# Patient Record
Sex: FEMALE | Race: WHITE | NOT HISPANIC OR LATINO | Employment: FULL TIME | URBAN - METROPOLITAN AREA
[De-identification: names, ages, dates, MRNs, and addresses within clinical notes are randomized per-mention and may not be internally consistent; named-entity substitution may affect disease eponyms.]

---

## 2022-02-11 LAB
AGE: 33
AGE: 33
ALBUMIN SERPL-MCNC: 4.4 G/DL (ref 3.4–5)
ALBUMIN SERPL-MCNC: 4.4 G/DL (ref 3.4–5)
ALBUMIN/GLOB SERPL: 1.8 {RATIO}
ALBUMIN/GLOB SERPL: 1.8 {RATIO}
ALP SERPL-CCNC: 68 U/L (ref 50–144)
ALP SERPL-CCNC: 68 U/L (ref 50–144)
ALT SERPL-CCNC: 14 U/L (ref 1–45)
ALT SERPL-CCNC: 14 U/L (ref 1–45)
ANION GAP SERPL CALC-SCNC: 6 MMOL/L (ref 2–13)
ANION GAP SERPL CALC-SCNC: 6 MMOL/L (ref 2–13)
AST SERPL-CCNC: 22 U/L (ref 14–36)
AST SERPL-CCNC: 22 U/L (ref 14–36)
BASOPHILS # BLD AUTO: 0.02 10*3/UL (ref 0.01–0.08)
BASOPHILS NFR BLD AUTO: 0.3 % (ref 0.1–1.2)
BILIRUB SERPL-MCNC: 0.47 MG/DL (ref 0–1)
BILIRUB SERPL-MCNC: 0.47 MG/DL (ref 0–1)
BUN SERPL-MCNC: 13 MG/DL (ref 7–20)
BUN SERPL-MCNC: 13 MG/DL (ref 7–20)
CALCIUM SERPL-MCNC: 9.1 MG/DL (ref 8.4–10.2)
CALCIUM SERPL-MCNC: 9.1 MG/DL (ref 8.4–10.2)
CHLORIDE SERPL-SCNC: 104 MMOL/L (ref 94–110)
CHLORIDE SERPL-SCNC: 104 MMOL/L (ref 94–110)
CHOLEST SERPL-MCNC: 166 MG/DL (ref 0–200)
CO2 SERPL-SCNC: 29 MMOL/L (ref 21–32)
CO2 SERPL-SCNC: 29 MMOL/L (ref 21–32)
CREAT SERPL-MCNC: 0.66 MG/DL (ref 0.52–1.04)
CREAT SERPL-MCNC: 0.66 MG/DL (ref 0.52–1.04)
CREAT/UREA NIT SERPL: 19.7 (ref 12–20)
CREAT/UREA NIT SERPL: 19.7 (ref 12–20)
EOSINOPHIL # BLD AUTO: 0.07 10*3/UL (ref 0.04–0.36)
EOSINOPHIL NFR BLD AUTO: 1.2 % (ref 0.7–7)
ERYTHROCYTE [DISTWIDTH] IN BLOOD BY AUTOMATED COUNT: 12.4 % (ref 11–14.5)
EST. AVERAGE GLUCOSE BLD GHB EST-MCNC: 90 MG/DL (ref 70–115)
GLOBULIN SER-MCNC: 2.5 G/DL (ref 2–3.9)
GLOBULIN SER-MCNC: 2.5 G/DL (ref 2–3.9)
GLUCOSE SERPL-MCNC: 84 MG/DL (ref 70–115)
GLUCOSE SERPL-MCNC: 84 MG/DL (ref 70–115)
HBA1C MFR BLD: 5 % (ref 4–6)
HCT VFR BLD AUTO: 37.9 % (ref 36–48)
HDLC SERPL-MCNC: 55 MG/DL (ref 40–60)
HGB BLD-MCNC: 12.5 G/DL (ref 11.8–16)
IMM GRANULOCYTES # BLD AUTO: 0.02 10*3/UL (ref 0–0.03)
IMM GRANULOCYTES NFR BLD AUTO: 0.3 % (ref 0–0.5)
LDLC SERPL CALC-MCNC: 93.9 MG/DL (ref 30–100)
LYMPHOCYTES # BLD AUTO: 2.38 10*3/UL (ref 1.16–3.74)
LYMPHOCYTES NFR BLD AUTO: 40.1 % (ref 20–55)
MANUAL DIFF? (OHS): NORMAL
MCH RBC QN AUTO: 31.6 PG (ref 27–34)
MCHC RBC AUTO-ENTMCNC: 33 G/DL (ref 31–37)
MCV RBC AUTO: 95.9 FL (ref 79–99)
MONOCYTES # BLD AUTO: 0.31 10*3/UL (ref 0.24–0.36)
MONOCYTES NFR BLD AUTO: 5.2 % (ref 4.7–12.5)
NEUTROPHILS # BLD AUTO: 3.14 10*3/UL (ref 1.56–6.13)
NEUTROPHILS NFR BLD AUTO: 52.9 % (ref 37–73)
PLATELET # BLD AUTO: 177 10*3/UL (ref 140–371)
PMV BLD AUTO: 10.2 FL (ref 9.4–12.4)
POTASSIUM SERPL-SCNC: 4.2 MMOL/L (ref 3.5–5.1)
POTASSIUM SERPL-SCNC: 4.2 MMOL/L (ref 3.5–5.1)
PROT SERPL-MCNC: 6.9 G/DL (ref 6.3–8.2)
PROT SERPL-MCNC: 6.9 G/DL (ref 6.3–8.2)
RBC # BLD AUTO: 3.95 10*6/UL (ref 4–5.1)
SODIUM SERPL-SCNC: 139 MMOL/L (ref 135–145)
SODIUM SERPL-SCNC: 139 MMOL/L (ref 135–145)
TRIGL SERPL-MCNC: 150 MG/DL (ref 30–200)
TSH SERPL-ACNC: 1.61 M[IU]/L (ref 0.36–3.74)
WBC # SPEC AUTO: 5.9 10*3/UL (ref 4–11.5)

## 2022-04-09 ENCOUNTER — HISTORICAL (OUTPATIENT)
Dept: ADMINISTRATIVE | Facility: HOSPITAL | Age: 32
End: 2022-04-09

## 2022-04-25 VITALS
DIASTOLIC BLOOD PRESSURE: 63 MMHG | BODY MASS INDEX: 36.6 KG/M2 | HEIGHT: 64 IN | WEIGHT: 214.38 LBS | SYSTOLIC BLOOD PRESSURE: 128 MMHG

## 2022-05-14 ENCOUNTER — HISTORICAL (OUTPATIENT)
Dept: ADMINISTRATIVE | Facility: HOSPITAL | Age: 32
End: 2022-05-14

## 2023-01-25 DIAGNOSIS — F41.9 ANXIETY: Primary | ICD-10-CM

## 2023-01-25 RX ORDER — ALPRAZOLAM 0.25 MG/1
0.25 TABLET ORAL 3 TIMES DAILY PRN
Qty: 30 TABLET | Refills: 0 | Status: SHIPPED | OUTPATIENT
Start: 2023-01-25 | End: 2023-03-01 | Stop reason: SDUPTHER

## 2023-01-25 RX ORDER — ALPRAZOLAM 0.25 MG/1
0.25 TABLET ORAL 3 TIMES DAILY PRN
COMMUNITY
Start: 2022-02-07 | End: 2023-01-25 | Stop reason: SDUPTHER

## 2023-01-25 NOTE — TELEPHONE ENCOUNTER
----- Message from Ankita Mcdaniels sent at 1/25/2023  9:39 AM CST -----  Regarding: refill  Patient is requesting a refill on     alPRAzolam 0.25 mg oral tab    Osmans Addie

## 2023-03-01 DIAGNOSIS — F41.9 ANXIETY: ICD-10-CM

## 2023-03-01 RX ORDER — ALPRAZOLAM 0.25 MG/1
0.25 TABLET ORAL 3 TIMES DAILY PRN
Qty: 30 TABLET | Refills: 0 | Status: SHIPPED | OUTPATIENT
Start: 2023-03-01 | End: 2023-08-02

## 2023-03-01 NOTE — TELEPHONE ENCOUNTER
----- Message from Felisha Garcia sent at 3/1/2023  3:05 PM CST -----  Regarding: Refill  .Type:  RX Refill Request    Who Called: Joaquina Mortensen   Refill or New Rx: Refill   RX Name and Strength:ALPRAZolam (XANAX) 0.25 MG tablet  How is the patient currently taking it? (ex. 1XDay):1x daily   Is this a 30 day or 90 day RX:30 days   Preferred Pharmacy with phone number:[facility] in [place]   Local or Mail Order:Local   Ordering Provider:Arslan  Would the patient rather a call back or a response via [facility]? Call back   Best Call Back Number:[phone]  Additional Information:

## 2023-03-16 ENCOUNTER — TELEPHONE (OUTPATIENT)
Dept: FAMILY MEDICINE | Facility: CLINIC | Age: 33
End: 2023-03-16
Payer: MEDICAID

## 2023-03-22 ENCOUNTER — OFFICE VISIT (OUTPATIENT)
Dept: FAMILY MEDICINE | Facility: CLINIC | Age: 33
End: 2023-03-22
Payer: MEDICAID

## 2023-03-22 VITALS
OXYGEN SATURATION: 98 % | DIASTOLIC BLOOD PRESSURE: 88 MMHG | TEMPERATURE: 98 F | HEIGHT: 62 IN | BODY MASS INDEX: 35.88 KG/M2 | SYSTOLIC BLOOD PRESSURE: 144 MMHG | HEART RATE: 94 BPM | WEIGHT: 195 LBS

## 2023-03-22 DIAGNOSIS — F41.9 ANXIETY: Primary | ICD-10-CM

## 2023-03-22 PROCEDURE — 3008F BODY MASS INDEX DOCD: CPT | Mod: CPTII,,, | Performed by: PEDIATRICS

## 2023-03-22 PROCEDURE — 99214 PR OFFICE/OUTPT VISIT, EST, LEVL IV, 30-39 MIN: ICD-10-PCS | Mod: ,,, | Performed by: PEDIATRICS

## 2023-03-22 PROCEDURE — 1159F PR MEDICATION LIST DOCUMENTED IN MEDICAL RECORD: ICD-10-PCS | Mod: CPTII,,, | Performed by: PEDIATRICS

## 2023-03-22 PROCEDURE — 1159F MED LIST DOCD IN RCRD: CPT | Mod: CPTII,,, | Performed by: PEDIATRICS

## 2023-03-22 PROCEDURE — 3008F PR BODY MASS INDEX (BMI) DOCUMENTED: ICD-10-PCS | Mod: CPTII,,, | Performed by: PEDIATRICS

## 2023-03-22 PROCEDURE — 3077F SYST BP >= 140 MM HG: CPT | Mod: CPTII,,, | Performed by: PEDIATRICS

## 2023-03-22 PROCEDURE — 3079F DIAST BP 80-89 MM HG: CPT | Mod: CPTII,,, | Performed by: PEDIATRICS

## 2023-03-22 PROCEDURE — 1160F PR REVIEW ALL MEDS BY PRESCRIBER/CLIN PHARMACIST DOCUMENTED: ICD-10-PCS | Mod: CPTII,,, | Performed by: PEDIATRICS

## 2023-03-22 PROCEDURE — 3077F PR MOST RECENT SYSTOLIC BLOOD PRESSURE >= 140 MM HG: ICD-10-PCS | Mod: CPTII,,, | Performed by: PEDIATRICS

## 2023-03-22 PROCEDURE — 3079F PR MOST RECENT DIASTOLIC BLOOD PRESSURE 80-89 MM HG: ICD-10-PCS | Mod: CPTII,,, | Performed by: PEDIATRICS

## 2023-03-22 PROCEDURE — 1160F RVW MEDS BY RX/DR IN RCRD: CPT | Mod: CPTII,,, | Performed by: PEDIATRICS

## 2023-03-22 PROCEDURE — 99214 OFFICE O/P EST MOD 30 MIN: CPT | Mod: ,,, | Performed by: PEDIATRICS

## 2023-03-22 RX ORDER — SUMATRIPTAN 50 MG/1
1 TABLET, FILM COATED ORAL
COMMUNITY
Start: 2022-10-25 | End: 2024-03-21

## 2023-03-22 RX ORDER — TRAZODONE HYDROCHLORIDE 150 MG/1
150 TABLET ORAL NIGHTLY
COMMUNITY
Start: 2023-01-25 | End: 2023-11-13

## 2023-03-22 RX ORDER — BREXPIPRAZOLE 1 MG/1
1 TABLET ORAL DAILY
COMMUNITY
Start: 2022-02-07 | End: 2023-11-02

## 2023-03-22 RX ORDER — CITALOPRAM 40 MG/1
40 TABLET, FILM COATED ORAL DAILY
COMMUNITY
Start: 2023-01-25 | End: 2023-06-20

## 2023-03-23 ENCOUNTER — TELEPHONE (OUTPATIENT)
Dept: FAMILY MEDICINE | Facility: CLINIC | Age: 33
End: 2023-03-23

## 2023-03-23 DIAGNOSIS — G47.30 SLEEP APNEA, UNSPECIFIED TYPE: Primary | ICD-10-CM

## 2023-03-23 NOTE — TELEPHONE ENCOUNTER
----- Message from Alina Morrison MA sent at 3/23/2023  1:22 PM CDT -----  Pt called about a sleep study.  I dont have a referral for this.  She was wanting to know if Dr Mcdaniels wanted her to have it done

## 2023-03-25 NOTE — PROGRESS NOTES
Subjective:       Patient ID: Joaquina Mortensen is a 32 y.o. female.    Chief Complaint: Fatigue (Confusion and anxiety)    HPI    We talked about her anxiety.  She has panic symptoms that she describes as fast heartbeats, shortness of breath, dizziness, and acute worsening in anxiety.  She is been having slightly more severe symptoms recently.  She does not report any specific triggers.  Recently while driving she had sort of an out-of-body experience where she felt really dizzy and lightheaded and just became confused.  She is been tired lately.      She does not report any shortness of breath, chest pain, syncope, irregular heartbeats.  She denies severe headaches, vision changes, true vertigo, tinnitus, hearing loss, focal areas of weakness or numbness.  Her sleeping is at her baseline most nights.  She does not report worsening depression symptoms.        Objective:      Physical Exam  Constitutional:       General: She is not in acute distress.     Appearance: Normal appearance.   HENT:      Mouth/Throat:      Pharynx: Oropharynx is clear.   Eyes:      General: No scleral icterus.     Extraocular Movements: Extraocular movements intact.      Conjunctiva/sclera: Conjunctivae normal.      Pupils: Pupils are equal, round, and reactive to light.   Neck:      Vascular: No carotid bruit.   Cardiovascular:      Rate and Rhythm: Normal rate and regular rhythm.      Pulses: Normal pulses.      Heart sounds: Normal heart sounds. No murmur heard.    No friction rub. No gallop.   Pulmonary:      Effort: Pulmonary effort is normal.      Breath sounds: Normal breath sounds.   Abdominal:      General: Abdomen is flat. Bowel sounds are normal. There is no distension.      Palpations: Abdomen is soft. There is no mass.      Tenderness: There is no abdominal tenderness.   Musculoskeletal:      Cervical back: Normal range of motion and neck supple. No tenderness.   Neurological:      General: No focal deficit present.       Mental Status: She is oriented to person, place, and time.      Cranial Nerves: Cranial nerve deficit present.      Sensory: No sensory deficit.      Motor: No weakness.      Coordination: Coordination normal.      Gait: Gait normal.      Deep Tendon Reflexes: Reflexes normal.   Psychiatric:         Mood and Affect: Mood normal.         Behavior: Behavior normal.         Thought Content: Thought content normal.         Judgment: Judgment normal.         Assessment:       Problem List Items Addressed This Visit    None  Visit Diagnoses       Anxiety    -  Primary            Plan:       I spent 30 minutes talking about about stress management and coping skills.  We discussed ways to deal with panic attacks.  I recommended she see a counselor. I encouraged her to exercise regularly specifically to improve her sleep and decrease her stress.  We talked about healthy eating, hydration and sleep.  We discussed reasons to return for medication adjustments and I mentioned the possibility of seeing a Psychiatrist.  We talked about other possibility etiologies of her symptoms and reasons to call for further testing such as syncope, weakness, vision changes, etc.

## 2023-03-30 ENCOUNTER — TELEPHONE (OUTPATIENT)
Dept: FAMILY MEDICINE | Facility: CLINIC | Age: 33
End: 2023-03-30
Payer: MEDICAID

## 2023-03-30 NOTE — TELEPHONE ENCOUNTER
Pt has been having panic attacks still.  She has appt with counselor on May 15th.  She said it was the soonest available.  Her bp today was her last four days has been 159/96, 132/92, 137/84, 145/74

## 2023-03-30 NOTE — TELEPHONE ENCOUNTER
----- Message from Ema Hewitt sent at 3/30/2023 11:06 AM CDT -----  Regarding: call back  B/P for appt. is still remaining high please call pt back     [phone]

## 2023-04-03 ENCOUNTER — OFFICE VISIT (OUTPATIENT)
Dept: FAMILY MEDICINE | Facility: CLINIC | Age: 33
End: 2023-04-03
Payer: MEDICAID

## 2023-04-03 VITALS
HEIGHT: 62 IN | WEIGHT: 195 LBS | DIASTOLIC BLOOD PRESSURE: 82 MMHG | OXYGEN SATURATION: 98 % | HEART RATE: 92 BPM | BODY MASS INDEX: 35.88 KG/M2 | SYSTOLIC BLOOD PRESSURE: 150 MMHG | TEMPERATURE: 99 F

## 2023-04-03 DIAGNOSIS — R00.9 ELEVATED HEART RATE WITH ELEVATED BLOOD PRESSURE WITHOUT DIAGNOSIS OF HYPERTENSION: ICD-10-CM

## 2023-04-03 DIAGNOSIS — R03.0 ELEVATED HEART RATE WITH ELEVATED BLOOD PRESSURE WITHOUT DIAGNOSIS OF HYPERTENSION: ICD-10-CM

## 2023-04-03 DIAGNOSIS — F41.9 ANXIETY: Primary | ICD-10-CM

## 2023-04-03 PROCEDURE — 99213 PR OFFICE/OUTPT VISIT, EST, LEVL III, 20-29 MIN: ICD-10-PCS | Mod: ,,, | Performed by: PEDIATRICS

## 2023-04-03 PROCEDURE — 1159F PR MEDICATION LIST DOCUMENTED IN MEDICAL RECORD: ICD-10-PCS | Mod: CPTII,,, | Performed by: PEDIATRICS

## 2023-04-03 PROCEDURE — 1159F MED LIST DOCD IN RCRD: CPT | Mod: CPTII,,, | Performed by: PEDIATRICS

## 2023-04-03 PROCEDURE — 3077F PR MOST RECENT SYSTOLIC BLOOD PRESSURE >= 140 MM HG: ICD-10-PCS | Mod: CPTII,,, | Performed by: PEDIATRICS

## 2023-04-03 PROCEDURE — 1160F PR REVIEW ALL MEDS BY PRESCRIBER/CLIN PHARMACIST DOCUMENTED: ICD-10-PCS | Mod: CPTII,,, | Performed by: PEDIATRICS

## 2023-04-03 PROCEDURE — 3079F DIAST BP 80-89 MM HG: CPT | Mod: CPTII,,, | Performed by: PEDIATRICS

## 2023-04-03 PROCEDURE — 3079F PR MOST RECENT DIASTOLIC BLOOD PRESSURE 80-89 MM HG: ICD-10-PCS | Mod: CPTII,,, | Performed by: PEDIATRICS

## 2023-04-03 PROCEDURE — 1160F RVW MEDS BY RX/DR IN RCRD: CPT | Mod: CPTII,,, | Performed by: PEDIATRICS

## 2023-04-03 PROCEDURE — 3077F SYST BP >= 140 MM HG: CPT | Mod: CPTII,,, | Performed by: PEDIATRICS

## 2023-04-03 PROCEDURE — 3008F PR BODY MASS INDEX (BMI) DOCUMENTED: ICD-10-PCS | Mod: CPTII,,, | Performed by: PEDIATRICS

## 2023-04-03 PROCEDURE — 3008F BODY MASS INDEX DOCD: CPT | Mod: CPTII,,, | Performed by: PEDIATRICS

## 2023-04-03 PROCEDURE — 99213 OFFICE O/P EST LOW 20 MIN: CPT | Mod: ,,, | Performed by: PEDIATRICS

## 2023-04-03 RX ORDER — PROPRANOLOL HYDROCHLORIDE 20 MG/1
20 TABLET ORAL 2 TIMES DAILY
Qty: 60 TABLET | Refills: 11 | Status: SHIPPED | OUTPATIENT
Start: 2023-04-03 | End: 2023-11-02

## 2023-04-03 RX ORDER — CLONAZEPAM 1 MG/1
1 TABLET ORAL 2 TIMES DAILY PRN
COMMUNITY
Start: 2023-03-31 | End: 2023-05-31

## 2023-04-03 NOTE — PROGRESS NOTES
Subjective     Patient ID: Joaquina Mortensen is a 32 y.o. female.    Chief Complaint: Anxiety and Hypertension (170s/110s)    HPI    She is here to discuss several elevated blood pressure readings over the last 2 weeks.  I reviewed her blood pressure log.  She has some normal readings but some that get as high as 170/100.  She continues to have anxiety and stress.  She often feels palpitations.  He denies flushing, syncope, weakness or numbness.  She does not report severe headaches recently.      Objective     Physical Exam     Exam is unchanged, no her stress  No thyromegaly or is in the neck  Heart regular rate and rhythm today without ectopy  She has a grossly normal neurologic exam without tremors    Assessment and Plan     Problem List Items Addressed This Visit    None  Visit Diagnoses       Anxiety    -  Primary    Elevated heart rate with elevated blood pressure without diagnosis of hypertension              Will add low-dose propranolol - start with 20 mg twice daily - hopefully this will help her anxiety and panic symptoms the help her blood pressure at least a little.  Recommended she check her blood pressure at home and call in 1-2 weeks to let me know what it is - we will likely need to add another antihypertensive.  She has scheduled an appointment with a counselor to help her with her anxiety and stress management  We made a referral for a sleep clinic evaluation to rule out obstructive sleep apnea - she is waiting to hear about the appointment

## 2023-05-17 ENCOUNTER — TELEPHONE (OUTPATIENT)
Dept: OBSTETRICS AND GYNECOLOGY | Facility: CLINIC | Age: 33
End: 2023-05-17
Payer: MEDICAID

## 2023-05-17 DIAGNOSIS — Z78.0 POST-MENOPAUSAL: Primary | ICD-10-CM

## 2023-05-17 RX ORDER — ESTRADIOL 1 MG/1
TABLET ORAL
Qty: 30 TABLET | Refills: 5 | Status: SHIPPED | OUTPATIENT
Start: 2023-05-17

## 2023-05-17 NOTE — TELEPHONE ENCOUNTER
DERICK CONFIRMED, SPOKE WITH PATIENT, INFORMED HER MEDICINE SENT TO PHARMACY. VERBAL UNDERSTANDING.            ----- Message from Alicia Giron sent at 2023  8:23 AM CDT -----  Regarding: REFILL  PT CALLED SAYING THAT SHE NEEDS A REFILL ON HER ESTRADIOL SENT TO [facility].

## 2023-05-17 NOTE — TELEPHONE ENCOUNTER
----- Message from Alicia Giron sent at 5/17/2023  8:23 AM CDT -----  Regarding: REFILL  PT CALLED SAYING THAT SHE NEEDS A REFILL ON HER ESTRADIOL SENT TO [facility].

## 2023-05-25 ENCOUNTER — TELEPHONE (OUTPATIENT)
Dept: FAMILY MEDICINE | Facility: CLINIC | Age: 33
End: 2023-05-25
Payer: MEDICAID

## 2023-05-25 NOTE — TELEPHONE ENCOUNTER
Pt said her anxiety is a little better since you prescribed propranolol- she said she hasn't had to take a xanax since.  She feels like her depression is worse.  She feels numb and has had crying spells.  She is on Celexa 40mg daily.    Procardia-Mario's

## 2023-05-31 ENCOUNTER — OFFICE VISIT (OUTPATIENT)
Dept: FAMILY MEDICINE | Facility: CLINIC | Age: 33
End: 2023-05-31
Payer: MEDICAID

## 2023-05-31 VITALS
OXYGEN SATURATION: 99 % | TEMPERATURE: 99 F | BODY MASS INDEX: 38.51 KG/M2 | HEIGHT: 61 IN | HEART RATE: 88 BPM | SYSTOLIC BLOOD PRESSURE: 132 MMHG | WEIGHT: 204 LBS | DIASTOLIC BLOOD PRESSURE: 80 MMHG

## 2023-05-31 DIAGNOSIS — F32.A DEPRESSION, UNSPECIFIED DEPRESSION TYPE: Primary | ICD-10-CM

## 2023-05-31 PROCEDURE — 1159F MED LIST DOCD IN RCRD: CPT | Mod: CPTII,,, | Performed by: PEDIATRICS

## 2023-05-31 PROCEDURE — 3008F BODY MASS INDEX DOCD: CPT | Mod: CPTII,,, | Performed by: PEDIATRICS

## 2023-05-31 PROCEDURE — 3008F PR BODY MASS INDEX (BMI) DOCUMENTED: ICD-10-PCS | Mod: CPTII,,, | Performed by: PEDIATRICS

## 2023-05-31 PROCEDURE — 99213 PR OFFICE/OUTPT VISIT, EST, LEVL III, 20-29 MIN: ICD-10-PCS | Mod: ,,, | Performed by: PEDIATRICS

## 2023-05-31 PROCEDURE — 1159F PR MEDICATION LIST DOCUMENTED IN MEDICAL RECORD: ICD-10-PCS | Mod: CPTII,,, | Performed by: PEDIATRICS

## 2023-05-31 PROCEDURE — 1160F PR REVIEW ALL MEDS BY PRESCRIBER/CLIN PHARMACIST DOCUMENTED: ICD-10-PCS | Mod: CPTII,,, | Performed by: PEDIATRICS

## 2023-05-31 PROCEDURE — 99213 OFFICE O/P EST LOW 20 MIN: CPT | Mod: ,,, | Performed by: PEDIATRICS

## 2023-05-31 PROCEDURE — 3075F SYST BP GE 130 - 139MM HG: CPT | Mod: CPTII,,, | Performed by: PEDIATRICS

## 2023-05-31 PROCEDURE — 3075F PR MOST RECENT SYSTOLIC BLOOD PRESS GE 130-139MM HG: ICD-10-PCS | Mod: CPTII,,, | Performed by: PEDIATRICS

## 2023-05-31 PROCEDURE — 3079F PR MOST RECENT DIASTOLIC BLOOD PRESSURE 80-89 MM HG: ICD-10-PCS | Mod: CPTII,,, | Performed by: PEDIATRICS

## 2023-05-31 PROCEDURE — 1160F RVW MEDS BY RX/DR IN RCRD: CPT | Mod: CPTII,,, | Performed by: PEDIATRICS

## 2023-05-31 PROCEDURE — 3079F DIAST BP 80-89 MM HG: CPT | Mod: CPTII,,, | Performed by: PEDIATRICS

## 2023-05-31 RX ORDER — DULOXETIN HYDROCHLORIDE 30 MG/1
30 CAPSULE, DELAYED RELEASE ORAL DAILY
Qty: 30 CAPSULE | Refills: 5 | Status: SHIPPED | OUTPATIENT
Start: 2023-05-31 | End: 2023-06-20 | Stop reason: DRUGHIGH

## 2023-05-31 NOTE — PROGRESS NOTES
Subjective     Patient ID: Joaquina Mortensen is a 32 y.o. female.    Chief Complaint: Discuss meds    HPI    She's here to discuss her mood.  She feels her anxiety is controlled.  She still feels depressed.  She does not have much motivation. She sleeps well.  She is fatigued most days. She's been on celexa for many years and asked about trying something new.        Objective     Physical Exam     She is pleasant, no apparent distress, we talked about her mood, anxiety and stress for about 20 minutes    Assessment and Plan     1. Depression, unspecified depression type      Cymbalta 30 mg po once daily for 1 week or so then 60 mg daily   Call in a few weeks to follow up

## 2023-06-20 ENCOUNTER — TELEPHONE (OUTPATIENT)
Dept: FAMILY MEDICINE | Facility: CLINIC | Age: 33
End: 2023-06-20
Payer: MEDICAID

## 2023-06-20 DIAGNOSIS — F32.A DEPRESSION, UNSPECIFIED DEPRESSION TYPE: Primary | ICD-10-CM

## 2023-06-20 RX ORDER — DULOXETIN HYDROCHLORIDE 60 MG/1
60 CAPSULE, DELAYED RELEASE ORAL DAILY
Qty: 30 CAPSULE | Refills: 5 | Status: SHIPPED | OUTPATIENT
Start: 2023-06-20 | End: 2023-11-02

## 2023-07-31 DIAGNOSIS — F41.9 ANXIETY: ICD-10-CM

## 2023-08-02 RX ORDER — ALPRAZOLAM 0.25 MG/1
TABLET ORAL
Qty: 30 TABLET | Refills: 0 | Status: SHIPPED | OUTPATIENT
Start: 2023-08-02 | End: 2023-11-13

## 2023-08-28 ENCOUNTER — TELEPHONE (OUTPATIENT)
Dept: FAMILY MEDICINE | Facility: CLINIC | Age: 33
End: 2023-08-28
Payer: MEDICAID

## 2023-08-28 NOTE — TELEPHONE ENCOUNTER
Spoke to pt, she is off tomorrow so she asked if she can be seen then.  She said she would see a NP since Dr. Mcdaniels was not here.

## 2023-08-28 NOTE — TELEPHONE ENCOUNTER
Pt states that she is having some pain in her ribs. Hurts to take a deep breath. Wanting to be seen tomorrow afternoon. Please advise.       [phone]

## 2023-08-29 ENCOUNTER — OFFICE VISIT (OUTPATIENT)
Dept: FAMILY MEDICINE | Facility: CLINIC | Age: 33
End: 2023-08-29
Payer: MEDICAID

## 2023-08-29 VITALS
TEMPERATURE: 97 F | HEART RATE: 75 BPM | DIASTOLIC BLOOD PRESSURE: 80 MMHG | HEIGHT: 62 IN | WEIGHT: 206 LBS | BODY MASS INDEX: 37.91 KG/M2 | SYSTOLIC BLOOD PRESSURE: 138 MMHG | OXYGEN SATURATION: 98 %

## 2023-08-29 DIAGNOSIS — R07.81 RIB PAIN ON LEFT SIDE: Primary | ICD-10-CM

## 2023-08-29 DIAGNOSIS — R11.2 NAUSEA AND VOMITING, UNSPECIFIED VOMITING TYPE: ICD-10-CM

## 2023-08-29 PROCEDURE — 3008F BODY MASS INDEX DOCD: CPT | Mod: CPTII,,, | Performed by: NURSE PRACTITIONER

## 2023-08-29 PROCEDURE — 3079F DIAST BP 80-89 MM HG: CPT | Mod: CPTII,,, | Performed by: NURSE PRACTITIONER

## 2023-08-29 PROCEDURE — 99213 PR OFFICE/OUTPT VISIT, EST, LEVL III, 20-29 MIN: ICD-10-PCS | Mod: ,,, | Performed by: NURSE PRACTITIONER

## 2023-08-29 PROCEDURE — 99213 OFFICE O/P EST LOW 20 MIN: CPT | Mod: ,,, | Performed by: NURSE PRACTITIONER

## 2023-08-29 PROCEDURE — 3075F PR MOST RECENT SYSTOLIC BLOOD PRESS GE 130-139MM HG: ICD-10-PCS | Mod: CPTII,,, | Performed by: NURSE PRACTITIONER

## 2023-08-29 PROCEDURE — 3075F SYST BP GE 130 - 139MM HG: CPT | Mod: CPTII,,, | Performed by: NURSE PRACTITIONER

## 2023-08-29 PROCEDURE — 3079F PR MOST RECENT DIASTOLIC BLOOD PRESSURE 80-89 MM HG: ICD-10-PCS | Mod: CPTII,,, | Performed by: NURSE PRACTITIONER

## 2023-08-29 PROCEDURE — 1159F PR MEDICATION LIST DOCUMENTED IN MEDICAL RECORD: ICD-10-PCS | Mod: CPTII,,, | Performed by: NURSE PRACTITIONER

## 2023-08-29 PROCEDURE — 3008F PR BODY MASS INDEX (BMI) DOCUMENTED: ICD-10-PCS | Mod: CPTII,,, | Performed by: NURSE PRACTITIONER

## 2023-08-29 PROCEDURE — 1159F MED LIST DOCD IN RCRD: CPT | Mod: CPTII,,, | Performed by: NURSE PRACTITIONER

## 2023-08-29 RX ORDER — ONDANSETRON 4 MG/1
4 TABLET, ORALLY DISINTEGRATING ORAL EVERY 12 HOURS PRN
Qty: 10 TABLET | Refills: 0 | Status: SHIPPED | OUTPATIENT
Start: 2023-08-29 | End: 2023-09-03

## 2023-08-29 RX ORDER — NAPROXEN 500 MG/1
500 TABLET ORAL 2 TIMES DAILY
Qty: 20 TABLET | Refills: 0 | Status: SHIPPED | OUTPATIENT
Start: 2023-08-29 | End: 2023-09-08

## 2023-08-29 NOTE — PROGRESS NOTES
"SUBJECTIVE:  Joaquina Mortensen is a 32 y.o. female here for pain in rib      HPI  Presents to the clinic with c/o left rib pain.  Pain started several days ago. She reports the pain caused her to have vomiting.  She denies any injury or event.  She does not associate the pain with food.  There is discomfort with deep inhalation.  Joaquina's allergies, medications, history, and problem list were updated as appropriate.    Review of Systems   Gastrointestinal:  Positive for nausea and vomiting.      A comprehensive review of symptoms was completed and negative except as noted above.    No results found for this or any previous visit (from the past 504 hour(s)).    OBJECTIVE:  Vital signs  Vitals:    08/29/23 1251   BP: 138/80   BP Location: Left arm   Patient Position: Sitting   Pulse: 75   Temp: 96.8 °F (36 °C)   TempSrc: Temporal   SpO2: 98%   Weight: 93.4 kg (206 lb)   Height: 5' 1.81" (1.57 m)        Physical Exam  Constitutional:       Appearance: Normal appearance.   HENT:      Head: Normocephalic and atraumatic.      Nose: Nose normal.      Mouth/Throat:      Mouth: Mucous membranes are moist.      Pharynx: Oropharynx is clear.   Eyes:      Conjunctiva/sclera: Conjunctivae normal.   Cardiovascular:      Rate and Rhythm: Normal rate and regular rhythm.   Pulmonary:      Effort: Pulmonary effort is normal.      Breath sounds: Normal breath sounds.   Chest:      Comments: Tenderness with palpation to lateral chest at lower left rib margin.  There is no noted deformity or ecchymosis.  Abdominal:      General: Bowel sounds are normal.      Palpations: Abdomen is soft.   Musculoskeletal:      Cervical back: Neck supple.   Skin:     General: Skin is warm.      Capillary Refill: Capillary refill takes less than 2 seconds.   Neurological:      Mental Status: She is alert.   Psychiatric:         Mood and Affect: Mood normal.         Behavior: Behavior normal.         Judgment: Judgment normal.      "     ASSESSMENT/PLAN:  1. Rib pain on left side  -     X-Ray Ribs 2 View Left; Future; Expected date: 08/29/2023  -     naproxen (NAPROSYN) 500 MG tablet; Take 1 tablet (500 mg total) by mouth 2 (two) times daily. for 10 days  Dispense: 20 tablet; Refill: 0    2. Nausea and vomiting, unspecified vomiting type  -     ondansetron (ZOFRAN-ODT) 4 MG TbDL; Take 1 tablet (4 mg total) by mouth every 12 (twelve) hours as needed (nausea/vomiting).  Dispense: 10 tablet; Refill: 0    Follow-up if symptoms worsen or do not resolve.  Instructed to eat with naproxen and not to take ibuprofen while taking naproxen.    Follow Up:  Follow up if symptoms worsen or fail to improve.

## 2023-08-29 NOTE — PROGRESS NOTES
"SUBJECTIVE:  Joaquina Mortensen is a 32 y.o. female here for pain in rib      HPI  ***  Joaquina's allergies, medications, history, and problem list were updated as appropriate.    Review of Systems   A comprehensive review of symptoms was completed and negative except as noted above.    No results found for this or any previous visit (from the past 504 hour(s)).    OBJECTIVE:  Vital signs  Vitals:    08/29/23 1251   BP: 138/80   BP Location: Left arm   Patient Position: Sitting   Pulse: 75   Temp: 96.8 °F (36 °C)   TempSrc: Temporal   SpO2: 98%   Weight: 93.4 kg (206 lb)   Height: 5' 1.81" (1.57 m)        Physical Exam     ASSESSMENT/PLAN:  {There are no diagnoses linked to this encounter. (Refresh or delete this SmartLink)}    Follow Up:  No follow-ups on file.    {Optional documentation below for documenting time spent for a visit to justify LOS. (This text will automatically delete.) :58961}{Time Based Documentation (Optional):68909}        "

## 2023-11-02 ENCOUNTER — TELEPHONE (OUTPATIENT)
Dept: FAMILY MEDICINE | Facility: CLINIC | Age: 33
End: 2023-11-02

## 2023-11-02 ENCOUNTER — OFFICE VISIT (OUTPATIENT)
Dept: FAMILY MEDICINE | Facility: CLINIC | Age: 33
End: 2023-11-02
Payer: MEDICAID

## 2023-11-02 VITALS
WEIGHT: 203 LBS | HEIGHT: 62 IN | TEMPERATURE: 97 F | BODY MASS INDEX: 37.36 KG/M2 | OXYGEN SATURATION: 99 % | HEART RATE: 82 BPM | SYSTOLIC BLOOD PRESSURE: 118 MMHG | DIASTOLIC BLOOD PRESSURE: 72 MMHG

## 2023-11-02 DIAGNOSIS — R51.9 NONINTRACTABLE EPISODIC HEADACHE, UNSPECIFIED HEADACHE TYPE: ICD-10-CM

## 2023-11-02 DIAGNOSIS — F41.9 ANXIETY: ICD-10-CM

## 2023-11-02 DIAGNOSIS — F32.A DEPRESSION, UNSPECIFIED DEPRESSION TYPE: ICD-10-CM

## 2023-11-02 DIAGNOSIS — R94.31 ABNORMAL ECG: ICD-10-CM

## 2023-11-02 DIAGNOSIS — R07.9 CHEST PAIN, UNSPECIFIED TYPE: Primary | ICD-10-CM

## 2023-11-02 PROCEDURE — 3008F PR BODY MASS INDEX (BMI) DOCUMENTED: ICD-10-PCS | Mod: CPTII,,, | Performed by: PEDIATRICS

## 2023-11-02 PROCEDURE — 93000 ELECTROCARDIOGRAM COMPLETE: CPT | Mod: ,,, | Performed by: PEDIATRICS

## 2023-11-02 PROCEDURE — 3078F DIAST BP <80 MM HG: CPT | Mod: CPTII,,, | Performed by: PEDIATRICS

## 2023-11-02 PROCEDURE — 1160F PR REVIEW ALL MEDS BY PRESCRIBER/CLIN PHARMACIST DOCUMENTED: ICD-10-PCS | Mod: CPTII,,, | Performed by: PEDIATRICS

## 2023-11-02 PROCEDURE — 99214 OFFICE O/P EST MOD 30 MIN: CPT | Mod: ,,, | Performed by: PEDIATRICS

## 2023-11-02 PROCEDURE — 3008F BODY MASS INDEX DOCD: CPT | Mod: CPTII,,, | Performed by: PEDIATRICS

## 2023-11-02 PROCEDURE — 3074F SYST BP LT 130 MM HG: CPT | Mod: CPTII,,, | Performed by: PEDIATRICS

## 2023-11-02 PROCEDURE — 3078F PR MOST RECENT DIASTOLIC BLOOD PRESSURE < 80 MM HG: ICD-10-PCS | Mod: CPTII,,, | Performed by: PEDIATRICS

## 2023-11-02 PROCEDURE — 93000 POCT EKG 12-LEAD: ICD-10-PCS | Mod: ,,, | Performed by: PEDIATRICS

## 2023-11-02 PROCEDURE — 1160F RVW MEDS BY RX/DR IN RCRD: CPT | Mod: CPTII,,, | Performed by: PEDIATRICS

## 2023-11-02 PROCEDURE — 3074F PR MOST RECENT SYSTOLIC BLOOD PRESSURE < 130 MM HG: ICD-10-PCS | Mod: CPTII,,, | Performed by: PEDIATRICS

## 2023-11-02 PROCEDURE — 99214 PR OFFICE/OUTPT VISIT, EST, LEVL IV, 30-39 MIN: ICD-10-PCS | Mod: ,,, | Performed by: PEDIATRICS

## 2023-11-02 PROCEDURE — 1159F MED LIST DOCD IN RCRD: CPT | Mod: CPTII,,, | Performed by: PEDIATRICS

## 2023-11-02 PROCEDURE — 1159F PR MEDICATION LIST DOCUMENTED IN MEDICAL RECORD: ICD-10-PCS | Mod: CPTII,,, | Performed by: PEDIATRICS

## 2023-11-02 RX ORDER — DULOXETIN HYDROCHLORIDE 60 MG/1
60 CAPSULE, DELAYED RELEASE ORAL 2 TIMES DAILY
Qty: 60 CAPSULE | Refills: 5 | Status: SHIPPED | OUTPATIENT
Start: 2023-11-02 | End: 2024-04-30

## 2023-11-02 RX ORDER — PROPRANOLOL HYDROCHLORIDE 20 MG/1
40 TABLET ORAL 2 TIMES DAILY
Qty: 60 TABLET | Refills: 11 | Status: SHIPPED | OUTPATIENT
Start: 2023-11-02 | End: 2024-02-07

## 2023-11-02 NOTE — PROGRESS NOTES
Subjective     Patient ID: Joaquina Mortensen is a 33 y.o. female.    Chief Complaint: Anxiety and Headache    HPI     She has noticed increased anxiety symptoms over the last few weeks.  She also has increased headaches.  Several times recently during times of severe anxiety she has noticed chest tightness that lasts several hours.  She says a few times she also had dyspnea.      ROS is otherwise negative.  She does not notice exertional chest pain.     She does not have a family history of premature vascular disease.  She has no history of smoking.     Objective     Physical Exam    No apparent distress  Neck without thyromegaly or carotid bruit  Heart RRR without murmurs or ectopy  Lungs clear, normal breathing    ECG today shows sinus rhythm with some nonspecific ST and T waves changes with an inverted T wave and some ST flattening     Assessment and Plan     1. Chest pain, unspecified type  -     POCT EKG 12-LEAD (Manually Resulted by Ordering Provider)    2. Depression, unspecified depression type  -     DULoxetine (CYMBALTA) 60 MG capsule; Take 1 capsule (60 mg total) by mouth 2 (two) times daily.  Dispense: 60 capsule; Refill: 5    3. Anxiety    4. Nonintractable episodic headache, unspecified headache type    5. Abnormal ECG    Other orders  -     propranoloL (INDERAL) 20 MG tablet; Take 2 tablets (40 mg total) by mouth 2 (two) times daily.  Dispense: 60 tablet; Refill: 11      We'll try to increase her cymbalta to 60 mg bid  Increase propranolol to 40 mg bid   Refer to cardiologist to discuss stress test  We reviewed stress management, coping skills, anxiety management  I think her symptoms are anxiety related but I think a stress test is reasonable due to the slight ECG abnormalities

## 2023-11-07 DIAGNOSIS — F41.9 ANXIETY: ICD-10-CM

## 2023-11-13 RX ORDER — ALPRAZOLAM 0.25 MG/1
TABLET ORAL
Qty: 30 TABLET | Refills: 0 | Status: SHIPPED | OUTPATIENT
Start: 2023-11-13 | End: 2023-12-14

## 2023-11-13 RX ORDER — TRAZODONE HYDROCHLORIDE 150 MG/1
TABLET ORAL
Qty: 30 TABLET | Refills: 5 | Status: SHIPPED | OUTPATIENT
Start: 2023-11-13

## 2023-12-12 DIAGNOSIS — F41.9 ANXIETY: ICD-10-CM

## 2023-12-14 RX ORDER — ALPRAZOLAM 0.25 MG/1
TABLET ORAL
Qty: 30 TABLET | Refills: 0 | Status: SHIPPED | OUTPATIENT
Start: 2023-12-14 | End: 2024-01-23

## 2024-01-10 ENCOUNTER — LAB VISIT (OUTPATIENT)
Dept: LAB | Facility: HOSPITAL | Age: 34
End: 2024-01-10
Attending: INTERNAL MEDICINE
Payer: COMMERCIAL

## 2024-01-10 DIAGNOSIS — R07.9 CHEST PAIN, UNSPECIFIED TYPE: Primary | ICD-10-CM

## 2024-01-10 DIAGNOSIS — I51.9 HEART DISEASE, UNSPECIFIED: ICD-10-CM

## 2024-01-10 LAB
ANION GAP SERPL CALC-SCNC: 9 MEQ/L (ref 2–13)
BUN SERPL-MCNC: 18 MG/DL (ref 7–20)
CALCIUM SERPL-MCNC: 8.9 MG/DL (ref 8.4–10.2)
CHLORIDE SERPL-SCNC: 104 MMOL/L (ref 98–110)
CO2 SERPL-SCNC: 29 MMOL/L (ref 21–32)
CREAT SERPL-MCNC: 0.78 MG/DL (ref 0.66–1.25)
CREAT/UREA NIT SERPL: 23 (ref 12–20)
GFR SERPLBLD CREATININE-BSD FMLA CKD-EPI: >90 MLS/MIN/1.73/M2
GLUCOSE SERPL-MCNC: 97 MG/DL (ref 70–115)
POTASSIUM SERPL-SCNC: 3.9 MMOL/L (ref 3.5–5.1)
SODIUM SERPL-SCNC: 142 MMOL/L (ref 135–145)

## 2024-01-10 PROCEDURE — 80048 BASIC METABOLIC PNL TOTAL CA: CPT

## 2024-01-10 PROCEDURE — 36415 COLL VENOUS BLD VENIPUNCTURE: CPT

## 2024-01-19 DIAGNOSIS — F41.9 ANXIETY: ICD-10-CM

## 2024-01-23 ENCOUNTER — HOSPITAL ENCOUNTER (OUTPATIENT)
Dept: RADIOLOGY | Facility: HOSPITAL | Age: 34
Discharge: HOME | End: 2024-01-23
Attending: INTERNAL MEDICINE
Payer: COMMERCIAL

## 2024-01-23 DIAGNOSIS — R94.31 NONSPECIFIC ABNORMAL ELECTROCARDIOGRAM (ECG) (EKG): ICD-10-CM

## 2024-01-23 PROCEDURE — 25500020 PHARM REV CODE 255: Performed by: INTERNAL MEDICINE

## 2024-01-23 PROCEDURE — 75572 CT HRT W/3D IMAGE: CPT | Mod: TC

## 2024-01-23 RX ORDER — ALPRAZOLAM 0.25 MG/1
TABLET ORAL
Qty: 30 TABLET | Refills: 0 | Status: SHIPPED | OUTPATIENT
Start: 2024-01-23 | End: 2024-03-04

## 2024-01-23 RX ADMIN — IOPAMIDOL 100 ML: 755 INJECTION, SOLUTION INTRAVENOUS at 03:01

## 2024-01-24 ENCOUNTER — HOSPITAL ENCOUNTER (EMERGENCY)
Facility: HOSPITAL | Age: 34
Discharge: HOME | End: 2024-01-24
Payer: COMMERCIAL

## 2024-01-24 VITALS
SYSTOLIC BLOOD PRESSURE: 141 MMHG | BODY MASS INDEX: 36.25 KG/M2 | HEIGHT: 61 IN | TEMPERATURE: 98 F | WEIGHT: 192 LBS | DIASTOLIC BLOOD PRESSURE: 69 MMHG | HEART RATE: 58 BPM | OXYGEN SATURATION: 100 % | RESPIRATION RATE: 20 BRPM

## 2024-01-24 DIAGNOSIS — R07.9 CHEST PAIN, UNSPECIFIED TYPE: Primary | ICD-10-CM

## 2024-01-24 DIAGNOSIS — R51.9 ACUTE NONINTRACTABLE HEADACHE, UNSPECIFIED HEADACHE TYPE: ICD-10-CM

## 2024-01-24 DIAGNOSIS — H53.9 VISION CHANGES: ICD-10-CM

## 2024-01-24 LAB
ALBUMIN SERPL-MCNC: 4.6 G/DL (ref 3.4–5)
ALBUMIN/GLOB SERPL: 1.8 RATIO
ALP SERPL-CCNC: 69 UNIT/L (ref 50–144)
ALT SERPL-CCNC: 37 UNIT/L (ref 1–45)
ANION GAP SERPL CALC-SCNC: 6 MEQ/L (ref 2–13)
AST SERPL-CCNC: 35 UNIT/L (ref 14–36)
BASOPHILS # BLD AUTO: 0.03 X10(3)/MCL (ref 0.01–0.08)
BASOPHILS NFR BLD AUTO: 0.5 % (ref 0.1–1.2)
BILIRUB SERPL-MCNC: 0.4 MG/DL (ref 0–1)
BUN SERPL-MCNC: 11 MG/DL (ref 7–20)
CALCIUM SERPL-MCNC: 8.9 MG/DL (ref 8.4–10.2)
CHLORIDE SERPL-SCNC: 103 MMOL/L (ref 98–110)
CO2 SERPL-SCNC: 29 MMOL/L (ref 21–32)
CREAT SERPL-MCNC: 0.76 MG/DL (ref 0.66–1.25)
CREAT/UREA NIT SERPL: 14 (ref 12–20)
EOSINOPHIL # BLD AUTO: 0.06 X10(3)/MCL (ref 0.04–0.36)
EOSINOPHIL NFR BLD AUTO: 1 % (ref 0.7–7)
ERYTHROCYTE [DISTWIDTH] IN BLOOD BY AUTOMATED COUNT: 11.5 % (ref 11–14.5)
GFR SERPLBLD CREATININE-BSD FMLA CKD-EPI: >90 MLS/MIN/1.73/M2
GLOBULIN SER-MCNC: 2.6 GM/DL (ref 2–3.9)
GLUCOSE SERPL-MCNC: 97 MG/DL (ref 70–115)
HCT VFR BLD AUTO: 37.5 % (ref 36–48)
HGB BLD-MCNC: 12.1 G/DL (ref 11.8–16)
IMM GRANULOCYTES # BLD AUTO: 0.01 X10(3)/MCL (ref 0–0.03)
IMM GRANULOCYTES NFR BLD AUTO: 0.2 % (ref 0–0.5)
LYMPHOCYTES # BLD AUTO: 2.39 X10(3)/MCL (ref 1.16–3.74)
LYMPHOCYTES NFR BLD AUTO: 39.4 % (ref 20–55)
MCH RBC QN AUTO: 31.8 PG (ref 27–34)
MCHC RBC AUTO-ENTMCNC: 32.3 G/DL (ref 31–37)
MCV RBC AUTO: 98.4 FL (ref 79–99)
MONOCYTES # BLD AUTO: 0.36 X10(3)/MCL (ref 0.24–0.36)
MONOCYTES NFR BLD AUTO: 5.9 % (ref 4.7–12.5)
NEUTROPHILS # BLD AUTO: 3.22 X10(3)/MCL (ref 1.56–6.13)
NEUTROPHILS NFR BLD AUTO: 53 % (ref 37–73)
NRBC BLD AUTO-RTO: 0 %
PLATELET # BLD AUTO: 173 X10(3)/MCL (ref 140–371)
PMV BLD AUTO: 9.9 FL (ref 9.4–12.4)
POTASSIUM SERPL-SCNC: 3.9 MMOL/L (ref 3.5–5.1)
PROT SERPL-MCNC: 7.2 GM/DL (ref 6.3–8.2)
RBC # BLD AUTO: 3.81 X10(6)/MCL (ref 4–5.1)
SODIUM SERPL-SCNC: 138 MMOL/L (ref 135–145)
TROPONIN I SERPL-MCNC: <0.012 NG/ML (ref 0–0.03)
WBC # SPEC AUTO: 6.07 X10(3)/MCL (ref 4–11.5)

## 2024-01-24 PROCEDURE — 99285 EMERGENCY DEPT VISIT HI MDM: CPT | Mod: 25

## 2024-01-24 PROCEDURE — 84484 ASSAY OF TROPONIN QUANT: CPT | Performed by: EMERGENCY MEDICINE

## 2024-01-24 PROCEDURE — 25500020 PHARM REV CODE 255: Performed by: EMERGENCY MEDICINE

## 2024-01-24 PROCEDURE — 80053 COMPREHEN METABOLIC PANEL: CPT | Performed by: EMERGENCY MEDICINE

## 2024-01-24 PROCEDURE — 85025 COMPLETE CBC W/AUTO DIFF WBC: CPT | Performed by: EMERGENCY MEDICINE

## 2024-01-24 PROCEDURE — 93005 ELECTROCARDIOGRAM TRACING: CPT

## 2024-01-24 PROCEDURE — 93010 ELECTROCARDIOGRAM REPORT: CPT | Mod: ,,, | Performed by: INTERNAL MEDICINE

## 2024-01-24 RX ADMIN — IOHEXOL 75 ML: 300 INJECTION, SOLUTION INTRAVENOUS at 10:01

## 2024-01-24 NOTE — ED PROVIDER NOTES
Encounter Date: 1/24/2024       History     Chief Complaint   Patient presents with    Eye Problem     C/o visual lines and lights onset after having ct angiogram to check for blockage, pt called dr. Renner this am and told to come in to er, pt reports it may be just anxiety, seeing dr. Renner for multiple cp complaints     Patient is a 33-year-old female who presents today with complaints of vision changes.  Patient states that she has been dealing with some chest pain and some anxiety.  She had a CT angiogram yesterday and ever since that CT angiogram she reports some vision changes.  Patient says she had the CT angiogram for her chest pain.  She describes the vision changes as seeing to lines in her eyes when line on the left and 1 line on the right of both of her eyes.  She also reports that she sees flashing lights as well.  This has been present since yesterday and unchanged.  She also reports intermittent migraine headaches and reports 1 today.  Denies any slurred speech or neurologic extremity deficits.      Review of patient's allergies indicates:   Allergen Reactions    Procardia [nifedipine] Shortness Of Breath     Past Medical History:   Diagnosis Date    Anxiety     Depression      Past Surgical History:   Procedure Laterality Date    HYSTERECTOMY      KNEE SURGERY Right     TUBAL LIGATION       History reviewed. No pertinent family history.  Social History     Tobacco Use    Smoking status: Never    Smokeless tobacco: Never   Substance Use Topics    Alcohol use: Not Currently    Drug use: Never     Review of Systems    See HPI. Otherwise Review of Systems negative.      Physical Exam     Initial Vitals [01/24/24 0959]   BP Pulse Resp Temp SpO2   133/78 (!) 57 20 97.9 °F (36.6 °C) 99 %      MAP       --         Physical Exam    Nursing note and vitals reviewed.  Constitutional: She appears well-developed and well-nourished. No distress.   HENT:   Head: Normocephalic and atraumatic.   Eyes: Conjunctivae  and EOM are normal. Pupils are equal, round, and reactive to light.   Neck: Neck supple. No tracheal deviation present.   Cardiovascular:  Normal rate, regular rhythm, normal heart sounds and intact distal pulses.           Pulmonary/Chest: Breath sounds normal. No respiratory distress.   Musculoskeletal:         General: No tenderness or edema. Normal range of motion.      Cervical back: Neck supple.     Neurological: She is alert and oriented to person, place, and time. GCS score is 15. GCS eye subscore is 4. GCS verbal subscore is 5. GCS motor subscore is 6.   No focal deficits. NIHSS 0   Skin: Skin is warm. No rash noted. No erythema.         ED Course   Procedures  Labs Reviewed   CBC WITH DIFFERENTIAL - Abnormal; Notable for the following components:       Result Value    RBC 3.81 (*)     All other components within normal limits   TROPONIN I - Normal   CBC W/ AUTO DIFFERENTIAL    Narrative:     The following orders were created for panel order CBC auto differential.  Procedure                               Abnormality         Status                     ---------                               -----------         ------                     CBC with Differential[[id number]]       Abnormal            Final result                 Please view results for these tests on the individual orders.   COMPREHENSIVE METABOLIC PANEL     Results for orders placed or performed during the hospital encounter of 01/24/24   Comprehensive metabolic panel   Result Value Ref Range    Sodium Level 138 135 - 145 mmol/L    Potassium Level 3.9 3.5 - 5.1 mmol/L    Chloride 103 98 - 110 mmol/L    Carbon Dioxide 29 21 - 32 mmol/L    Glucose Level 97 70 - 115 mg/dL    Blood Urea Nitrogen 11.0 7.0 - 20.0 mg/dL    Creatinine 0.76 0.66 - 1.25 mg/dL    Calcium Level Total 8.9 8.4 - 10.2 mg/dL    Protein Total 7.2 6.3 - 8.2 gm/dL    Albumin Level 4.6 3.4 - 5.0 g/dL    Globulin 2.6 2.0 - 3.9 gm/dL    Albumin/Globulin Ratio 1.8 ratio    Bilirubin  Total 0.4 0.0 - 1.0 mg/dL    Alkaline Phosphatase 69 50 - 144 unit/L    Alanine Aminotransferase 37 1 - 45 unit/L    Aspartate Aminotransferase 35 14 - 36 unit/L    eGFR >90 mls/min/1.73/m2    Anion Gap 6.0 2.0 - 13.0 mEq/L    BUN/Creatinine Ratio 14 12 - 20   CBC with Differential   Result Value Ref Range    WBC 6.07 4.00 - 11.50 x10(3)/mcL    RBC 3.81 (L) 4.00 - 5.10 x10(6)/mcL    Hgb 12.1 11.8 - 16.0 g/dL    Hct 37.5 36.0 - 48.0 %    MCV 98.4 79.0 - 99.0 fL    MCH 31.8 27.0 - 34.0 pg    MCHC 32.3 31.0 - 37.0 g/dL    RDW 11.5 11.0 - 14.5 %    Platelet 173 140 - 371 x10(3)/mcL    MPV 9.9 9.4 - 12.4 fL    Neut % 53.0 37 - 73 %    Lymph % 39.4 20 - 55 %    Mono % 5.9 4.7 - 12.5 %    Eos % 1.0 0.7 - 7 %    Basophil % 0.5 0.1 - 1.2 %    Lymph # 2.39 1.16 - 3.74 x10(3)/mcL    Neut # 3.22 1.56 - 6.13 x10(3)/mcL    Mono # 0.36 0.24 - 0.36 x10(3)/mcL    Eos # 0.06 0.04 - 0.36 x10(3)/mcL    Baso # 0.03 0.01 - 0.08 x10(3)/mcL    IG# 0.01 0.0001 - 0.031 x10(3)/mcL    IG% 0.2 0 - 0.5 %    NRBC% 0.0 <=1 %   Troponin I   Result Value Ref Range    Troponin-I <0.012 0.000 - 0.034 ng/mL            Imaging Results              MRI Brain Without Contrast (Final result)  Result time 01/24/24 14:40:14      Final result by Tariq Gagnon MD (01/24/24 14:40:14)                   Impression:    Impression:    1.  No acute intracranial process.      Electronically signed by: Tariq Gagnon  Date:    01/24/2024  Time:    14:40               Narrative:    EXAMINATION:  MRI BRAIN WITHOUT CONTRAST    CLINICAL HISTORY:  Neuro deficit, acute, stroke suspected;Diplopia;    TECHNIQUE:  Multiplanar multisequence MR imaging of the brain was performed without contrast.    COMPARISON:  None.    FINDINGS:  The grey-white differentiation is normal.  There is no intracranial mass or midline shift.  No evidence of acute infarct on the diffusion weighted sequence.  No extra-axial fluid collections.  The orbits and intraorbital contents, paranasal  sinuses, pituitary gland, brainstem, and cranial nerves, where visualzed, appear unremarkable.                                       CTA Head and Neck (xpd) (Final result)  Result time 01/24/24 11:56:29      Final result by Sarwat Sanders MD (01/24/24 11:56:29)                   Impression:        Mild atherosclerotic changes are in the intracranial arteries causing no hemodynamically significant stenosis.    ============================================================    CT ANGIOGRAM OF THE NECK WITH AND WITHOUT CONTRAST AND WITH 3-D POST PROCESSING:    CPT: 03730, 85730    Total DLP: 217.5 mGy-cm; automatic exposure control was utilized to limit radiation dose to patient.    Total contrast: 75 mL in left antecubital vein.    Comparison study: CT scan performed earlier today.    History:    Code stroke. Acute onset of focal neurologic deficit with diplopia and other visual changes.    Procedure: Multiple thin cut axial images were acquired from the base of the skull to the thoracic inlet during intravenous contrast administration.  3D postprocessing and multiplanar reformatting was performed on the source images at the workstation by the radiologist.    Findings:    All stenosis measurements are performed utilizing NASCET criteria on this exam. There are minimal to mild atherosclerotic changes in the bulb of the left cervical ICA.  Otherwise, both CCA,, both carotid bifurcations, but is cervical ICA, and both cervical vertebral arteries are widely patent and unremarkable.  The right cervical vertebral artery is slightly more dominant than the left.  Multiple lymph nodes still within normal limits by CT size criteria scattered in multiple levels of the bilateral neck.  The thyroid gland is poorly visualized and is heterogeneous common underlying thyroid nodules cannot be excluded.  The lung apices are grossly unremarkable.        Minimal to mild atherosclerotic changes in the bulb of the proximal left ICA.  No  hemodynamically significant stenosis is identified.      Electronically signed by: Sarwat Sanders MD  Date:    01/24/2024  Time:    11:56               Narrative:      CT ANGIOGRAM OF THE HEAD WITH AND WITHOUT CONTRAST:    CPT: 91866, 49846    Total DLP: 217.5 mGy-cm; automatic exposure control was utilized to limit radiation dose to patient.    Total contrast: 75 mL in left antecubital vein.    Comparison study: CT scan performed earlier today.    History:    Code stroke.  Acute onset of focal neurologic deficit with diplopia and other visual changes.    Technique: Multiple thin cut contiguous axial images were acquired in the head without contrast penetration. 3D postprocessing and multiplanar reformatting was performed on the source images at the workstation by the radiologist.    Findings:    This study is limited by venous opacification by contrast.  There are mild atherosclerotic changes in the intracranial arteries causing no hemodynamically significant stenosis.  No aneurysm, arteriovenous malformation, or other abnormalities identified.  The P1 segment of the right posterior cerebral artery is extremely hypoplastic.                                       CT Head Without Contrast (Final result)  Result time 01/24/24 10:52:02      Final result by Tariq Gagnon MD (01/24/24 10:52:02)                   Impression:        1. No acute intracranial abnormality.    n/a    Category: n/a    The following dose reduction techniques are used for all CT at [facility]:    1.   Automated exposure control.    2.   Adjustment of the mA and/or kV according to patient size.    3.   Use of iterative reconstruction technique.      Electronically signed by: Tariq Gagnon  Date:    01/24/2024  Time:    10:52               Narrative:    EXAMINATION:  CT HEAD WITHOUT CONTRAST    CLINICAL HISTORY:  Neuro deficit, acute, stroke suspected;Diplopia;    TECHNIQUE:  Low dose axial images were obtained through the  "head.  Coronal and sagittal reformations were also performed. Contrast was not administered.    COMPARISON:  None.    FINDINGS:  Multiplanar imaging through the head/brain was performed.  I see no intraparynchymal masses, hemorhagic lesions, or dominant wedge shaped infarcts. I see no extraxial masses or abnormal fluid collections.                                       X-Ray Chest AP Portable (Final result)  Result time 01/24/24 12:07:11      Final result by Tariq Gagnon MD (01/24/24 12:07:11)                   Impression:      No acute abnormality.      Electronically signed by: Tariq Gagnon  Date:    01/24/2024  Time:    12:07               Narrative:    EXAMINATION:  XR CHEST AP PORTABLE    CLINICAL HISTORY:  chest pain;    TECHNIQUE:  Single frontal view of the chest was performed.    COMPARISON:  None    FINDINGS:  The lungs are clear, with normal appearance of pulmonary vasculature and no pleural effusion or pneumothorax.    The cardiac silhouette is normal in size. The hilar and mediastinal contours are unremarkable.    Bones are intact.                                       Medications   iohexoL (OMNIPAQUE 300) injection 75 mL (75 mLs Intravenous Given 1/24/24 1049)     Medical Decision Making  33-year-old female presented with reports of vision changes after CT angiogram.  Referred here to the emergency department.  Visual deficits described as 2 lines coming down.  Patient's description of visual deficits not typical of CVA.  She also reports a headache and chest pain and anxiety.  Differential diagnosis includes but not limited to CVA, complex migraine, idiopathic intracranial hypertension with papilledema, MI, anxiety.    Imaging within normal limits.  On reexamination, patient is vision changes had resolved.  She was offered medication for headache, but responded "no thank you just take some ibuprofen at home." She was advised to continue following up with her outpatient physicians as previously " planned.  Follow up with Ophthalmology if vision changes recur.        Amount and/or Complexity of Data Reviewed  External Data Reviewed: notes.  Labs: ordered. Decision-making details documented in ED Course.  Radiology: ordered and independent interpretation performed. Decision-making details documented in ED Course.  ECG/medicine tests: ordered and independent interpretation performed. Decision-making details documented in ED Course.     Details: EKG reviewed interpreted by ED provider as sinus bradycardia with a rate of 59, normal axis, T-wave inversion in lead 3, no ST depression or ST elevation    Risk  OTC drugs.  Prescription drug management.  Parenteral controlled substances.                                      Clinical Impression:  Final diagnoses:  [H53.9] Vision changes  [R07.9] Chest pain, unspecified type (Primary)  [R51.9] Acute nonintractable headache, unspecified headache type          ED Disposition Condition    Discharge Stable          ED Prescriptions    None       Follow-up Information       Follow up With Specialties Details Why Contact Info    Follow up outpatient as planned        [facility]  As needed, If symptoms worsen [address]  [facility] [id number] [phone]             Michael Alcazar MD  01/24/24 6313

## 2024-02-02 ENCOUNTER — LAB VISIT (OUTPATIENT)
Dept: LAB | Facility: HOSPITAL | Age: 34
End: 2024-02-02
Attending: INTERNAL MEDICINE
Payer: COMMERCIAL

## 2024-02-02 DIAGNOSIS — R07.9 CHEST PAIN, UNSPECIFIED TYPE: ICD-10-CM

## 2024-02-02 DIAGNOSIS — I51.9 HEART DISEASE, UNSPECIFIED: ICD-10-CM

## 2024-02-02 DIAGNOSIS — R94.31 NONSPECIFIC ABNORMAL ELECTROCARDIOGRAM (ECG) (EKG): Primary | ICD-10-CM

## 2024-02-02 LAB
ANION GAP SERPL CALC-SCNC: 9 MEQ/L (ref 2–13)
BUN SERPL-MCNC: 18 MG/DL (ref 7–20)
CALCIUM SERPL-MCNC: 9.5 MG/DL (ref 8.4–10.2)
CHLORIDE SERPL-SCNC: 103 MMOL/L (ref 98–110)
CO2 SERPL-SCNC: 27 MMOL/L (ref 21–32)
CREAT SERPL-MCNC: 1.02 MG/DL (ref 0.66–1.25)
CREAT/UREA NIT SERPL: 18 (ref 12–20)
GFR SERPLBLD CREATININE-BSD FMLA CKD-EPI: 74 MLS/MIN/1.73/M2
GLUCOSE SERPL-MCNC: 88 MG/DL (ref 70–115)
POTASSIUM SERPL-SCNC: 3.9 MMOL/L (ref 3.5–5.1)
SODIUM SERPL-SCNC: 139 MMOL/L (ref 135–145)

## 2024-02-02 PROCEDURE — 80048 BASIC METABOLIC PNL TOTAL CA: CPT

## 2024-02-02 PROCEDURE — 36415 COLL VENOUS BLD VENIPUNCTURE: CPT

## 2024-02-05 ENCOUNTER — TELEPHONE (OUTPATIENT)
Dept: FAMILY MEDICINE | Facility: CLINIC | Age: 34
End: 2024-02-05
Payer: COMMERCIAL

## 2024-02-06 ENCOUNTER — TELEPHONE (OUTPATIENT)
Dept: FAMILY MEDICINE | Facility: CLINIC | Age: 34
End: 2024-02-06
Payer: COMMERCIAL

## 2024-02-06 NOTE — TELEPHONE ENCOUNTER
Spoke to pt, she asked me to call the insurance again.  She called and they told her it would be covered with a PA.

## 2024-02-06 NOTE — TELEPHONE ENCOUNTER
I spoke to pt, I called her insurance.  They will not cover the additional 30 capsules/month.  She will continue to pay out of pocket for those.

## 2024-02-07 ENCOUNTER — OFFICE VISIT (OUTPATIENT)
Dept: FAMILY MEDICINE | Facility: CLINIC | Age: 34
End: 2024-02-07
Payer: COMMERCIAL

## 2024-02-07 VITALS
OXYGEN SATURATION: 98 % | WEIGHT: 197 LBS | DIASTOLIC BLOOD PRESSURE: 70 MMHG | HEIGHT: 61 IN | SYSTOLIC BLOOD PRESSURE: 126 MMHG | BODY MASS INDEX: 37.19 KG/M2 | HEART RATE: 92 BPM

## 2024-02-07 DIAGNOSIS — M79.672 FOOT PAIN, LEFT: Primary | ICD-10-CM

## 2024-02-07 PROCEDURE — 1159F MED LIST DOCD IN RCRD: CPT | Mod: CPTII,,, | Performed by: PEDIATRICS

## 2024-02-07 PROCEDURE — 3008F BODY MASS INDEX DOCD: CPT | Mod: CPTII,,, | Performed by: PEDIATRICS

## 2024-02-07 PROCEDURE — 99212 OFFICE O/P EST SF 10 MIN: CPT | Mod: ,,, | Performed by: PEDIATRICS

## 2024-02-07 PROCEDURE — 3078F DIAST BP <80 MM HG: CPT | Mod: CPTII,,, | Performed by: PEDIATRICS

## 2024-02-07 PROCEDURE — 1160F RVW MEDS BY RX/DR IN RCRD: CPT | Mod: CPTII,,, | Performed by: PEDIATRICS

## 2024-02-07 PROCEDURE — 3074F SYST BP LT 130 MM HG: CPT | Mod: CPTII,,, | Performed by: PEDIATRICS

## 2024-02-07 PROCEDURE — 4010F ACE/ARB THERAPY RXD/TAKEN: CPT | Mod: CPTII,,, | Performed by: PEDIATRICS

## 2024-02-07 RX ORDER — METOPROLOL SUCCINATE 25 MG/1
25 TABLET, EXTENDED RELEASE ORAL DAILY
COMMUNITY
Start: 2024-01-31

## 2024-02-07 RX ORDER — NITROGLYCERIN 0.4 MG/1
TABLET SUBLINGUAL EVERY 5 MIN PRN
COMMUNITY
Start: 2023-11-22

## 2024-02-07 RX ORDER — VALSARTAN 40 MG/1
40 TABLET ORAL DAILY
COMMUNITY
Start: 2024-01-31

## 2024-02-07 NOTE — PROGRESS NOTES
Subjective     Patient ID: Joaquina Mortensen is a 33 y.o. female.    Chief Complaint: Foot Pain    HPI    She is here with painful foot lesions on the bottom of her left foot for 1-2 months.  They hurt when she puts pressure on them.  She does not remember stepping on anything or any other unusual associated signs or symptoms.     Objective     Physical Exam     She has 1 small lesion under the foot pad distally that is about 2-3 mm in diameter and then a larger lesion that is about 1-1.5 cm they both have thickened skin consistent either with callus or wart    Assessment and Plan     1. Foot pain, left      We talked about footwear and inserts to take the pressure off of those areas  She will observe and either return here or to a podiatrist office if the symptoms worsen significantly or if it looks more obviously like she has warts and we will try to freeze those

## 2024-03-04 DIAGNOSIS — F41.9 ANXIETY: ICD-10-CM

## 2024-03-04 RX ORDER — ALPRAZOLAM 0.25 MG/1
TABLET ORAL
Qty: 30 TABLET | Refills: 0 | Status: SHIPPED | OUTPATIENT
Start: 2024-03-04 | End: 2024-05-08

## 2024-03-21 ENCOUNTER — TELEPHONE (OUTPATIENT)
Dept: FAMILY MEDICINE | Facility: CLINIC | Age: 34
End: 2024-03-21
Payer: COMMERCIAL

## 2024-03-21 ENCOUNTER — PATIENT MESSAGE (OUTPATIENT)
Dept: FAMILY MEDICINE | Facility: CLINIC | Age: 34
End: 2024-03-21
Payer: COMMERCIAL

## 2024-03-21 DIAGNOSIS — G43.809 OTHER MIGRAINE WITHOUT STATUS MIGRAINOSUS, NOT INTRACTABLE: Primary | ICD-10-CM

## 2024-03-21 RX ORDER — RIZATRIPTAN BENZOATE 10 MG/1
10 TABLET, ORALLY DISINTEGRATING ORAL
Qty: 20 TABLET | Refills: 1 | Status: SHIPPED | OUTPATIENT
Start: 2024-03-21 | End: 2024-04-20

## 2024-03-21 NOTE — TELEPHONE ENCOUNTER
Pt states that she has been fighting with constant migraines. She states that she is taking OTC ibuprofen & tylenol about every 4-6 hours. It isn't really helping & she is staying nauseated. She would like a call back. Please advise.         Joaquina - [phone]

## 2024-05-07 DIAGNOSIS — F41.9 ANXIETY: ICD-10-CM

## 2024-05-08 RX ORDER — ALPRAZOLAM 0.25 MG/1
0.25 TABLET ORAL
Qty: 30 TABLET | Refills: 0 | Status: SHIPPED | OUTPATIENT
Start: 2024-05-08 | End: 2024-06-04

## 2024-05-13 ENCOUNTER — CLINICAL SUPPORT (OUTPATIENT)
Dept: FAMILY MEDICINE | Facility: CLINIC | Age: 34
End: 2024-05-13
Payer: COMMERCIAL

## 2024-05-13 ENCOUNTER — PATIENT MESSAGE (OUTPATIENT)
Dept: FAMILY MEDICINE | Facility: CLINIC | Age: 34
End: 2024-05-13

## 2024-05-13 DIAGNOSIS — R30.0 DYSURIA: Primary | ICD-10-CM

## 2024-05-13 LAB
BILIRUB SERPL-MCNC: NEGATIVE MG/DL
BLOOD URINE, POC: NEGATIVE
CLARITY, POC UA: CLEAR
COLOR, POC UA: YELLOW
GLUCOSE UR QL STRIP: NEGATIVE
KETONES UR QL STRIP: NEGATIVE
LEUKOCYTE ESTERASE URINE, POC: NEGATIVE
NITRITE, POC UA: NEGATIVE
PH, POC UA: 7.5
PROTEIN, POC: NEGATIVE
SPECIFIC GRAVITY, POC UA: 1.02
UROBILINOGEN, POC UA: 0.2

## 2024-05-13 PROCEDURE — 81002 URINALYSIS NONAUTO W/O SCOPE: CPT | Mod: ,,, | Performed by: PEDIATRICS

## 2024-05-13 NOTE — PROGRESS NOTES
Patient completed U/A, she stated that she has been taking AZO     Symptoms: Cloudy urine and dysuria  Duration: On and off for about a week  Allergies: Procardia  Pregnant: no  Pharmacy: [facility]    Called to inform patient that urine results were normal per Dr. Mcdaniels.  Informed patient that Dr. Mcdaniels instructed her to stay hydrated, drink plenty of water, and schedule an office appointment if symptoms worsen or continue for several more days.

## 2024-05-13 NOTE — PROGRESS NOTES
Patient came in for U/A.    Symptoms: cloudy urine and dysuria  Duration: On and off for 2 weeks

## 2024-05-22 ENCOUNTER — PATIENT MESSAGE (OUTPATIENT)
Dept: FAMILY MEDICINE | Facility: CLINIC | Age: 34
End: 2024-05-22
Payer: COMMERCIAL

## 2024-06-04 DIAGNOSIS — F41.9 ANXIETY: ICD-10-CM

## 2024-06-04 RX ORDER — ALPRAZOLAM 0.25 MG/1
0.25 TABLET ORAL
Qty: 30 TABLET | Refills: 0 | Status: SHIPPED | OUTPATIENT
Start: 2024-06-04

## 2024-07-08 ENCOUNTER — OFFICE VISIT (OUTPATIENT)
Dept: OBSTETRICS AND GYNECOLOGY | Facility: CLINIC | Age: 34
End: 2024-07-08
Payer: COMMERCIAL

## 2024-07-08 VITALS
WEIGHT: 202 LBS | DIASTOLIC BLOOD PRESSURE: 62 MMHG | HEART RATE: 86 BPM | SYSTOLIC BLOOD PRESSURE: 128 MMHG | BODY MASS INDEX: 38.19 KG/M2

## 2024-07-08 DIAGNOSIS — Z90.710 ACQUIRED ABSENCE OF CERVIX: ICD-10-CM

## 2024-07-08 DIAGNOSIS — Z01.419 ROUTINE GYNECOLOGICAL EXAMINATION: Primary | ICD-10-CM

## 2024-07-08 PROCEDURE — 4010F ACE/ARB THERAPY RXD/TAKEN: CPT | Mod: CPTII,,, | Performed by: OBSTETRICS & GYNECOLOGY

## 2024-07-08 PROCEDURE — 3078F DIAST BP <80 MM HG: CPT | Mod: CPTII,,, | Performed by: OBSTETRICS & GYNECOLOGY

## 2024-07-08 PROCEDURE — 1160F RVW MEDS BY RX/DR IN RCRD: CPT | Mod: CPTII,,, | Performed by: OBSTETRICS & GYNECOLOGY

## 2024-07-08 PROCEDURE — 3008F BODY MASS INDEX DOCD: CPT | Mod: CPTII,,, | Performed by: OBSTETRICS & GYNECOLOGY

## 2024-07-08 PROCEDURE — 99395 PREV VISIT EST AGE 18-39: CPT | Mod: ,,, | Performed by: OBSTETRICS & GYNECOLOGY

## 2024-07-08 PROCEDURE — 3074F SYST BP LT 130 MM HG: CPT | Mod: CPTII,,, | Performed by: OBSTETRICS & GYNECOLOGY

## 2024-07-08 PROCEDURE — 1159F MED LIST DOCD IN RCRD: CPT | Mod: CPTII,,, | Performed by: OBSTETRICS & GYNECOLOGY

## 2024-07-08 RX ORDER — SACUBITRIL AND VALSARTAN 49; 51 MG/1; MG/1
TABLET, FILM COATED ORAL
COMMUNITY
Start: 2024-06-25

## 2024-07-08 NOTE — PROGRESS NOTES
Patient ID: [id number]   Chief Complaint: Annual exam  Chief Complaint   Patient presents with    Annual Exam     NO C/O'S.     HPI:   Joaquina Mortensen is a 33 y.o. year old  here for her Annual Exam.   No LMP recorded (lmp unknown). Patient has had a hysterectomy.   She is doing well. Denies any health changes.   Annual Exam (NO C/O'S.)  HRT WORKING WELL.  Subjective:     Past Medical History:   Diagnosis Date    Anxiety     Depression      Past Surgical History:   Procedure Laterality Date    KNEE SURGERY Right     TOTAL ABDOMINAL HYSTERECTOMY W/ BILATERAL SALPINGOOPHORECTOMY      TUBAL LIGATION       Social History     Tobacco Use    Smoking status: Never    Smokeless tobacco: Never   Substance Use Topics    Alcohol use: Not Currently    Drug use: Never     Family History   Problem Relation Name Age of Onset    Diabetes Brother      Diabetes Maternal Uncle       OB History    Para Term  AB Living   2 2 2     2   SAB IAB Ectopic Multiple Live Births           2      # Outcome Date GA Lbr Zhang/2nd Weight Sex Type Anes PTL Lv   2 Term 12 39w0d   M CS-Unspec   CHITRA   1 Term 08/13/10 39w0d   F CS-Unspec   CHITRA       Current Outpatient Medications:     ALPRAZolam (XANAX) 0.25 MG tablet, TAKE ONE TABLET BY MOUTH THREE TIMES A DAY AS NEEDED FOR ANXIETY, Disp: 30 tablet, Rfl: 0    estradioL (ESTRACE) 1 MG tablet, TAKE ONE TABLET EVERY DAY, Disp: 30 tablet, Rfl: 5    metoprolol succinate (TOPROL-XL) 25 MG 24 hr tablet, Take 25 mg by mouth once daily., Disp: , Rfl:     nitroGLYCERIN (NITROSTAT) 0.4 MG SL tablet, Place under the tongue every 5 (five) minutes as needed., Disp: , Rfl:     sacubitriL-valsartan (ENTRESTO) 49-51 mg per tablet, , Disp: , Rfl:     traZODone (DESYREL) 150 MG tablet, 1 tab(s) BY MOUTH EVERY EVENING, Disp: 30 tablet, Rfl: 5    DULoxetine (CYMBALTA) 60 MG capsule, Take 1 capsule (60 mg total) by mouth 2 (two) times daily., Disp: 60 capsule, Rfl: 5    rizatriptan (MAXALT-MLT) 10  MG disintegrating tablet, Take 1 tablet (10 mg total) by mouth as needed for Migraine. May repeat in 2 hours if needed not to exceed 3 tablets in 24 hours, Disp: 20 tablet, Rfl: 1  MENARCHEAL:  LARISSA/BSO  PAP:  Last PAP: LARISSA/BSO  History of Abnormal PAP Smear: YES:   Treated: UNSURE  HPV Vaccine: NO  INTERCOURSE:  Dyspareunia: No  Postcoital Bleeding: No  History of STI: No   If yes, then: No   Current Birth Control Method: status post hysterectomy  Sexually Active: yes  Review of Systems 12 point review of systems conducted, negative except as stated in the history of present illness.     See HPI for details.  Objective:   Visit Vitals  /62   Pulse 86   Wt 91.6 kg (202 lb)   LMP  (LMP Unknown)   BMI 38.19 kg/m²     No results found for this or any previous visit (from the past 24 hour(s)).  Physical Exam:  Chaperone present for exam.  Physical Exam  Constitutional:  General Appearance : alert, in no acute distress, normal, well nourished.  Cardiovascular:   Regular rate and rhythm.  Respiratory:  Respiratory Effort: normal.  Breast:  Right: Inspection/palpation: no discharge, no masses present, no nipple retraction, no skin changes, no skin dimpling, no tenderness, no lymphadenopathy, no axillary mass, no axillary tenderness.  Left: Inspection/palpation: no discharge, no masses present, no nipple retraction, no skin changes, no skin dimpling, no tenderness, no lymphadenopathy, no axillary mass, no axillary tenderness.  Gastrointestinal:  Abdomen: no masses. no tender, nondistended.  Genitourinary:  External Genitalia: normal, no lesions.  Vagina: normal appearance, no abnormal discharge, no lesions.  Bladder: no mass, nontender.  Urethra: no erythema or lesions present.  Cervix: SURGICALLY ABSENT  Uterus: SURGICALLY  ABSENT  Adnexa: SURGICALLY ABSENT  Anus and Perineum: visually normal.     No results found for this or any previous visit (from the past 24 hour(s)).  Assessment/Plan:     Assessment:   Routine  gynecological examination    Acquired absence of cervix    Follow up in about 1 year (around 7/8/2025) for WWE.     In addition to their scheduled follow-up, the patient has also been instructed to follow up on as needed basis.   All questions were answered and the patient voiced understanding of the above issues.

## 2024-07-17 DIAGNOSIS — F41.9 ANXIETY: ICD-10-CM

## 2024-07-17 DIAGNOSIS — Z78.0 POST-MENOPAUSAL: ICD-10-CM

## 2024-07-17 RX ORDER — ESTRADIOL 1 MG/1
1 TABLET ORAL
Qty: 30 TABLET | Refills: 5 | Status: SHIPPED | OUTPATIENT
Start: 2024-07-17

## 2024-07-17 RX ORDER — ALPRAZOLAM 0.25 MG/1
0.25 TABLET ORAL
Qty: 30 TABLET | Refills: 0 | Status: SHIPPED | OUTPATIENT
Start: 2024-07-17

## 2024-08-20 DIAGNOSIS — F41.9 ANXIETY: ICD-10-CM

## 2024-08-20 RX ORDER — ALPRAZOLAM 0.25 MG/1
0.25 TABLET ORAL
Qty: 30 TABLET | Refills: 0 | Status: SHIPPED | OUTPATIENT
Start: 2024-08-20

## 2024-08-26 ENCOUNTER — TELEPHONE (OUTPATIENT)
Dept: FAMILY MEDICINE | Facility: CLINIC | Age: 34
End: 2024-08-26
Payer: COMMERCIAL

## 2024-08-26 NOTE — TELEPHONE ENCOUNTER
Spoke to patient.  She will work on icing, stretching, tylenol and motrin.  If it is worsening or not improving she will come in.

## 2024-08-26 NOTE — TELEPHONE ENCOUNTER
Pt left a message in the portal on Friday stating that she needs an appt as soon as possible. States that she fell and hurt her back. She is having severe pain. Please advise.

## 2024-09-03 ENCOUNTER — OFFICE VISIT (OUTPATIENT)
Dept: FAMILY MEDICINE | Facility: CLINIC | Age: 34
End: 2024-09-03
Payer: COMMERCIAL

## 2024-09-03 VITALS
SYSTOLIC BLOOD PRESSURE: 122 MMHG | DIASTOLIC BLOOD PRESSURE: 76 MMHG | OXYGEN SATURATION: 95 % | HEIGHT: 61 IN | HEART RATE: 65 BPM | WEIGHT: 199.81 LBS | TEMPERATURE: 99 F | BODY MASS INDEX: 37.72 KG/M2

## 2024-09-03 DIAGNOSIS — Z23 ENCOUNTER FOR ADMINISTRATION OF VACCINE: ICD-10-CM

## 2024-09-03 DIAGNOSIS — M54.50 ACUTE MIDLINE LOW BACK PAIN WITHOUT SCIATICA: Primary | ICD-10-CM

## 2024-09-03 PROCEDURE — 90471 IMMUNIZATION ADMIN: CPT | Mod: ,,, | Performed by: NURSE PRACTITIONER

## 2024-09-03 PROCEDURE — 4010F ACE/ARB THERAPY RXD/TAKEN: CPT | Mod: CPTII,,, | Performed by: NURSE PRACTITIONER

## 2024-09-03 PROCEDURE — 3078F DIAST BP <80 MM HG: CPT | Mod: CPTII,,, | Performed by: NURSE PRACTITIONER

## 2024-09-03 PROCEDURE — 90656 IIV3 VACC NO PRSV 0.5 ML IM: CPT | Mod: ,,, | Performed by: NURSE PRACTITIONER

## 2024-09-03 PROCEDURE — 99214 OFFICE O/P EST MOD 30 MIN: CPT | Mod: 25,,, | Performed by: NURSE PRACTITIONER

## 2024-09-03 PROCEDURE — 1159F MED LIST DOCD IN RCRD: CPT | Mod: CPTII,,, | Performed by: NURSE PRACTITIONER

## 2024-09-03 PROCEDURE — 3008F BODY MASS INDEX DOCD: CPT | Mod: CPTII,,, | Performed by: NURSE PRACTITIONER

## 2024-09-03 PROCEDURE — 3074F SYST BP LT 130 MM HG: CPT | Mod: CPTII,,, | Performed by: NURSE PRACTITIONER

## 2024-09-03 RX ORDER — METHYLPREDNISOLONE 4 MG/1
TABLET ORAL
Qty: 21 EACH | Refills: 0 | Status: SHIPPED | OUTPATIENT
Start: 2024-09-03

## 2024-09-03 RX ORDER — TRAMADOL HYDROCHLORIDE 50 MG/1
50 TABLET ORAL EVERY 8 HOURS PRN
Qty: 21 TABLET | Refills: 0 | Status: SHIPPED | OUTPATIENT
Start: 2024-09-03

## 2024-09-03 RX ORDER — TIZANIDINE 2 MG/1
2 TABLET ORAL EVERY 8 HOURS PRN
Qty: 21 TABLET | Refills: 0 | Status: SHIPPED | OUTPATIENT
Start: 2024-09-03

## 2024-09-03 NOTE — PROGRESS NOTES
"SUBJECTIVE:  Joaquina Mortensen is a 34 y.o. female here for Fall and Back Pain      HPI  Presents to the [facility] with c/o back pain and right knee pain. Reports she fell about 2 weeks ago and the pain has not improved.  She has back spasms on and off.  Denies radiation of the pain.  Right knee has a scab.  The pain to the knee has improved some.    Joaquina's allergies, medications, history, and problem list were updated as appropriate.    Review of Systems   A comprehensive review of symptoms was completed and negative except as noted above.    No results found for this or any previous visit (from the past 504 hour(s)).    OBJECTIVE:  Vital signs  Vitals:    09/03/24 1332   BP: 122/76   BP Location: Left arm   Patient Position: Sitting   Pulse: 65   Temp: 99.3 °F (37.4 °C)   TempSrc: Oral   SpO2: 95%   Weight: 90.6 kg (199 lb 12.8 oz)   Height: 5' 0.98" (1.549 m)        Physical Exam  Constitutional:       Appearance: Normal appearance.   HENT:      Head: Normocephalic and atraumatic.      Nose: Nose normal.      Mouth/Throat:      Mouth: Mucous membranes are moist.      Pharynx: Oropharynx is clear.   Eyes:      Conjunctiva/sclera: Conjunctivae normal.   Cardiovascular:      Rate and Rhythm: Normal rate and regular rhythm.   Pulmonary:      Effort: Pulmonary effort is normal.      Breath sounds: Normal breath sounds.   Abdominal:      General: Bowel sounds are normal.      Palpations: Abdomen is soft.   Musculoskeletal:      Cervical back: Normal range of motion and neck supple.      Lumbar back: Tenderness present. Decreased range of motion.      Comments: Pain to low back from waist to upper buttock with palpation.   R>L.  Negative SLR bilaterally.  LE DTRs 2+   Skin:     General: Skin is warm.      Capillary Refill: Capillary refill takes less than 2 seconds.   Neurological:      Mental Status: She is alert.   Psychiatric:         Mood and Affect: Mood is anxious.         Behavior: Behavior normal.         " Judgment: Judgment normal.          ASSESSMENT/PLAN:  1. Acute midline low back pain without sciatica  Comments:  gentle stretching, no heavy lifting, mosit warm compresses several time daily, discussed possible therapy if not improved in 2 weeks  Orders:  -     methylPREDNISolone (MEDROL DOSEPACK) 4 mg tablet; use as directed  Dispense: 21 each; Refill: 0  -     traMADoL (ULTRAM) 50 mg tablet; Take 1 tablet (50 mg total) by mouth every 8 (eight) hours as needed for Pain.  Dispense: 21 tablet; Refill: 0  -     tiZANidine (ZANAFLEX) 2 MG tablet; Take 1 tablet (2 mg total) by mouth every 8 (eight) hours as needed (muscle spasms).  Dispense: 21 tablet; Refill: 0    2. Encounter for administration of vaccine  -     influenza (Flulaval, Fluzone, Fluarix) 45 mcg/0.5 mL vaccine 0.5 mL    Patient verbalized understanding of need to b e careful with use tramadol and tizanidine with other medication for sleep and anxiety.    Follow Up:  Follow up if symptoms worsen or fail to improve.

## 2024-09-23 DIAGNOSIS — F41.9 ANXIETY: ICD-10-CM

## 2024-09-23 RX ORDER — ALPRAZOLAM 0.25 MG/1
0.25 TABLET ORAL
Qty: 30 TABLET | Refills: 0 | Status: SHIPPED | OUTPATIENT
Start: 2024-09-23

## 2024-10-11 ENCOUNTER — TELEPHONE (OUTPATIENT)
Dept: PHARMACY | Facility: CLINIC | Age: 34
End: 2024-10-11
Payer: COMMERCIAL

## 2024-10-11 NOTE — TELEPHONE ENCOUNTER
"    Hypertension  Hypertension, commonly called high blood pressure, is when the force of blood pumping through the arteries is too strong. The arteries are the blood vessels that carry blood from the heart throughout the body. Hypertension forces the heart to work harder to pump blood and may cause arteries to become narrow or stiff. Having untreated or uncontrolled hypertension can cause heart attacks, strokes, kidney disease, and other problems.  A blood pressure reading consists of a higher number over a lower number. Ideally, your blood pressure should be below 120/80. The first (\"top\") number is called the systolic pressure. It is a measure of the pressure in your arteries as your heart beats. The second (\"bottom\") number is called the diastolic pressure. It is a measure of the pressure in your arteries as the heart relaxes.  What are the causes?  The cause of this condition is not known.  What increases the risk?  Some risk factors for high blood pressure are under your control. Others are not.  Factors you can change   · Smoking.  · Having type 2 diabetes mellitus, high cholesterol, or both.  · Not getting enough exercise or physical activity.  · Being overweight.  · Having too much fat, sugar, calories, or salt (sodium) in your diet.  · Drinking too much alcohol.  Factors that are difficult or impossible to change   · Having chronic kidney disease.  · Having a family history of high blood pressure.  · Age. Risk increases with age.  · Race. You may be at higher risk if you are -American.  · Gender. Men are at higher risk than women before age 45. After age 65, women are at higher risk than men.  · Having obstructive sleep apnea.  · Stress.  What are the signs or symptoms?  Extremely high blood pressure (hypertensive crisis) may cause:  · Headache.  · Anxiety.  · Shortness of breath.  · Nosebleed.  · Nausea and vomiting.  · Severe chest pain.  · Jerky movements you cannot control (seizures).  How is " [facility]/Population Health Chart Review & Patient Outreach Details For Medication Adherence Project    Reason for Outreach Encounter: 3rd Party payor non-compliance report ([employer], [employer], C, etc)  2.  Patient Outreach Method: Reviewed patient chart   3.   Medication in question: Valsartan  Hypertension Medications               metoprolol succinate (TOPROL-XL) 25 MG 24 hr tablet Take 25 mg by mouth once daily.    nitroGLYCERIN (NITROSTAT) 0.4 MG SL tablet Place under the tongue every 5 (five) minutes as needed.    sacubitriL-valsartan (ENTRESTO) 49-51 mg per tablet                   4.  Reviewed and or Updates Made To: Patient Chart  5. Outreach Outcomes and/or actions taken: Medication discontinued  Additional Notes:   Valsartan [place] 7/8/24- switched to sacubitriL-valsartan (ENTRESTO) 49-51 mg per tablet       this diagnosed?  This condition is diagnosed by measuring your blood pressure while you are seated, with your arm resting on a surface. The cuff of the blood pressure monitor will be placed directly against the skin of your upper arm at the level of your heart. It should be measured at least twice using the same arm. Certain conditions can cause a difference in blood pressure between your right and left arms.  Certain factors can cause blood pressure readings to be lower or higher than normal (elevated) for a short period of time:  · When your blood pressure is higher when you are in a health care provider's office than when you are at home, this is called white coat hypertension. Most people with this condition do not need medicines.  · When your blood pressure is higher at home than when you are in a health care provider's office, this is called masked hypertension. Most people with this condition may need medicines to control blood pressure.  If you have a high blood pressure reading during one visit or you have normal blood pressure with other risk factors:  · You may be asked to return on a different day to have your blood pressure checked again.  · You may be asked to monitor your blood pressure at home for 1 week or longer.  If you are diagnosed with hypertension, you may have other blood or imaging tests to help your health care provider understand your overall risk for other conditions.  How is this treated?  This condition is treated by making healthy lifestyle changes, such as eating healthy foods, exercising more, and reducing your alcohol intake. Your health care provider may prescribe medicine if lifestyle changes are not enough to get your blood pressure under control, and if:  · Your systolic blood pressure is above 130.  · Your diastolic blood pressure is above 80.  Your personal target blood pressure may vary depending on your medical conditions, your age, and other factors.  Follow these  instructions at home:  Eating and drinking   · Eat a diet that is high in fiber and potassium, and low in sodium, added sugar, and fat. An example eating plan is called the DASH (Dietary Approaches to Stop Hypertension) diet. To eat this way:  ¨ Eat plenty of fresh fruits and vegetables. Try to fill half of your plate at each meal with fruits and vegetables.  ¨ Eat whole grains, such as whole wheat pasta, brown rice, or whole grain bread. Fill about one quarter of your plate with whole grains.  ¨ Eat or drink low-fat dairy products, such as skim milk or low-fat yogurt.  ¨ Avoid fatty cuts of meat, processed or cured meats, and poultry with skin. Fill about one quarter of your plate with lean proteins, such as fish, chicken without skin, beans, eggs, and tofu.  ¨ Avoid premade and processed foods. These tend to be higher in sodium, added sugar, and fat.  · Reduce your daily sodium intake. Most people with hypertension should eat less than 1,500 mg of sodium a day.  · Limit alcohol intake to no more than 1 drink a day for nonpregnant women and 2 drinks a day for men. One drink equals 12 oz of beer, 5 oz of wine, or 1½ oz of hard liquor.  Lifestyle   · Work with your health care provider to maintain a healthy body weight or to lose weight. Ask what an ideal weight is for you.  · Get at least 30 minutes of exercise that causes your heart to beat faster (aerobic exercise) most days of the week. Activities may include walking, swimming, or biking.  · Include exercise to strengthen your muscles (resistance exercise), such as pilates or lifting weights, as part of your weekly exercise routine. Try to do these types of exercises for 30 minutes at least 3 days a week.  · Do not use any products that contain nicotine or tobacco, such as cigarettes and e-cigarettes. If you need help quitting, ask your health care provider.  · Monitor your blood pressure at home as told by your health care provider.  · Keep all follow-up visits  as told by your health care provider. This is important.  Medicines   · Take over-the-counter and prescription medicines only as told by your health care provider. Follow directions carefully. Blood pressure medicines must be taken as prescribed.  · Do not skip doses of blood pressure medicine. Doing this puts you at risk for problems and can make the medicine less effective.  · Ask your health care provider about side effects or reactions to medicines that you should watch for.  Contact a health care provider if:  · You think you are having a reaction to a medicine you are taking.  · You have headaches that keep coming back (recurring).  · You feel dizzy.  · You have swelling in your ankles.  · You have trouble with your vision.  Get help right away if:  · You develop a severe headache or confusion.  · You have unusual weakness or numbness.  · You feel faint.  · You have severe pain in your chest or abdomen.  · You vomit repeatedly.  · You have trouble breathing.  Summary  · Hypertension is when the force of blood pumping through your arteries is too strong. If this condition is not controlled, it may put you at risk for serious complications.  · Your personal target blood pressure may vary depending on your medical conditions, your age, and other factors. For most people, a normal blood pressure is less than 120/80.  · Hypertension is treated with lifestyle changes, medicines, or a combination of both. Lifestyle changes include weight loss, eating a healthy, low-sodium diet, exercising more, and limiting alcohol.  This information is not intended to replace advice given to you by your health care provider. Make sure you discuss any questions you have with your health care provider.  Document Released: 12/18/2006 Document Revised: 11/15/2017 Document Reviewed: 11/15/2017  [employer] Interactive Patient Education © 2017 [employer]

## 2024-10-22 DIAGNOSIS — F41.9 ANXIETY: ICD-10-CM

## 2024-10-22 DIAGNOSIS — F32.A DEPRESSION, UNSPECIFIED DEPRESSION TYPE: ICD-10-CM

## 2024-10-22 RX ORDER — DULOXETIN HYDROCHLORIDE 60 MG/1
60 CAPSULE, DELAYED RELEASE ORAL 2 TIMES DAILY
Qty: 60 CAPSULE | Refills: 5 | Status: SHIPPED | OUTPATIENT
Start: 2024-10-22 | End: 2025-04-20

## 2024-10-24 RX ORDER — ALPRAZOLAM 0.25 MG/1
0.25 TABLET ORAL
Qty: 30 TABLET | Refills: 0 | Status: SHIPPED | OUTPATIENT
Start: 2024-10-24

## 2024-11-21 ENCOUNTER — PATIENT MESSAGE (OUTPATIENT)
Dept: FAMILY MEDICINE | Facility: CLINIC | Age: 34
End: 2024-11-21
Payer: COMMERCIAL

## 2024-11-25 ENCOUNTER — LAB VISIT (OUTPATIENT)
Dept: LAB | Facility: HOSPITAL | Age: 34
End: 2024-11-25
Attending: NURSE PRACTITIONER
Payer: COMMERCIAL

## 2024-11-25 DIAGNOSIS — F41.9 ANXIETY: ICD-10-CM

## 2024-11-25 DIAGNOSIS — R60.9 EDEMA, UNSPECIFIED TYPE: Primary | ICD-10-CM

## 2024-11-25 LAB
ANION GAP SERPL CALC-SCNC: 9 MEQ/L (ref 2–13)
BUN SERPL-MCNC: 11 MG/DL (ref 7–20)
CALCIUM SERPL-MCNC: 9.9 MG/DL (ref 8.4–10.2)
CHLORIDE SERPL-SCNC: 102 MMOL/L (ref 98–110)
CO2 SERPL-SCNC: 27 MMOL/L (ref 21–32)
CREAT SERPL-MCNC: 0.79 MG/DL (ref 0.66–1.25)
CREAT/UREA NIT SERPL: 14 (ref 12–20)
GFR SERPLBLD CREATININE-BSD FMLA CKD-EPI: >90 ML/MIN/1.73/M2
GLUCOSE SERPL-MCNC: 93 MG/DL (ref 70–115)
POTASSIUM SERPL-SCNC: 4.1 MMOL/L (ref 3.5–5.1)
SODIUM SERPL-SCNC: 138 MMOL/L (ref 136–145)

## 2024-11-25 PROCEDURE — 80048 BASIC METABOLIC PNL TOTAL CA: CPT

## 2024-11-25 PROCEDURE — 36415 COLL VENOUS BLD VENIPUNCTURE: CPT

## 2024-12-02 ENCOUNTER — OFFICE VISIT (OUTPATIENT)
Dept: FAMILY MEDICINE | Facility: CLINIC | Age: 34
End: 2024-12-02
Payer: COMMERCIAL

## 2024-12-02 ENCOUNTER — PATIENT MESSAGE (OUTPATIENT)
Dept: FAMILY MEDICINE | Facility: CLINIC | Age: 34
End: 2024-12-02

## 2024-12-02 VITALS
HEART RATE: 85 BPM | DIASTOLIC BLOOD PRESSURE: 82 MMHG | HEIGHT: 61 IN | WEIGHT: 197.63 LBS | BODY MASS INDEX: 37.31 KG/M2 | SYSTOLIC BLOOD PRESSURE: 126 MMHG | OXYGEN SATURATION: 97 % | TEMPERATURE: 97 F

## 2024-12-02 DIAGNOSIS — J98.9 RESPIRATORY ILLNESS: Primary | ICD-10-CM

## 2024-12-02 PROCEDURE — 99214 OFFICE O/P EST MOD 30 MIN: CPT | Mod: 25,,, | Performed by: NURSE PRACTITIONER

## 2024-12-02 PROCEDURE — 1159F MED LIST DOCD IN RCRD: CPT | Mod: CPTII,,, | Performed by: NURSE PRACTITIONER

## 2024-12-02 PROCEDURE — 94640 AIRWAY INHALATION TREATMENT: CPT | Mod: ,,, | Performed by: NURSE PRACTITIONER

## 2024-12-02 PROCEDURE — 96372 THER/PROPH/DIAG INJ SC/IM: CPT | Mod: 59,,, | Performed by: NURSE PRACTITIONER

## 2024-12-02 PROCEDURE — 3079F DIAST BP 80-89 MM HG: CPT | Mod: CPTII,,, | Performed by: NURSE PRACTITIONER

## 2024-12-02 PROCEDURE — 4010F ACE/ARB THERAPY RXD/TAKEN: CPT | Mod: CPTII,,, | Performed by: NURSE PRACTITIONER

## 2024-12-02 PROCEDURE — 3008F BODY MASS INDEX DOCD: CPT | Mod: CPTII,,, | Performed by: NURSE PRACTITIONER

## 2024-12-02 PROCEDURE — 3074F SYST BP LT 130 MM HG: CPT | Mod: CPTII,,, | Performed by: NURSE PRACTITIONER

## 2024-12-02 RX ORDER — ALBUTEROL SULFATE 0.83 MG/ML
2.5 SOLUTION RESPIRATORY (INHALATION) EVERY 6 HOURS PRN
Qty: 60 EACH | Refills: 0 | Status: SHIPPED | OUTPATIENT
Start: 2024-12-02

## 2024-12-02 RX ORDER — ALBUTEROL SULFATE 0.83 MG/ML
2.5 SOLUTION RESPIRATORY (INHALATION)
Status: COMPLETED | OUTPATIENT
Start: 2024-12-02 | End: 2024-12-02

## 2024-12-02 RX ORDER — IPRATROPIUM BROMIDE 0.5 MG/2.5ML
0.5 SOLUTION RESPIRATORY (INHALATION)
Status: COMPLETED | OUTPATIENT
Start: 2024-12-02 | End: 2024-12-02

## 2024-12-02 RX ORDER — DEXAMETHASONE SODIUM PHOSPHATE 10 MG/ML
10 INJECTION INTRAMUSCULAR; INTRAVENOUS
Status: COMPLETED | OUTPATIENT
Start: 2024-12-02 | End: 2024-12-02

## 2024-12-02 RX ORDER — SERTRALINE HYDROCHLORIDE 50 MG/1
50 TABLET, FILM COATED ORAL DAILY
COMMUNITY
Start: 2024-10-29

## 2024-12-02 RX ORDER — AZITHROMYCIN 250 MG/1
TABLET, FILM COATED ORAL
Qty: 6 TABLET | Refills: 0 | Status: SHIPPED | OUTPATIENT
Start: 2024-12-02 | End: 2024-12-07

## 2024-12-02 RX ORDER — SPIRONOLACTONE 25 MG/1
25 TABLET ORAL 2 TIMES DAILY
COMMUNITY
Start: 2024-11-15

## 2024-12-02 RX ORDER — ALPRAZOLAM 0.25 MG/1
0.25 TABLET ORAL
Qty: 30 TABLET | Refills: 0 | Status: SHIPPED | OUTPATIENT
Start: 2024-12-02

## 2024-12-02 RX ADMIN — DEXAMETHASONE SODIUM PHOSPHATE 10 MG: 10 INJECTION INTRAMUSCULAR; INTRAVENOUS at 08:12

## 2024-12-02 RX ADMIN — ALBUTEROL SULFATE 2.5 MG: 0.83 SOLUTION RESPIRATORY (INHALATION) at 08:12

## 2024-12-02 RX ADMIN — IPRATROPIUM BROMIDE 0.5 MG: 0.5 SOLUTION RESPIRATORY (INHALATION) at 08:12

## 2024-12-02 NOTE — PROGRESS NOTES
SUBJECTIVE:  Joaquina Mortensen is a 34 y.o. female here for Cough, Sore Throat, Shortness of Breath, and Nasal Congestion (All symptoms started last Wednesday)      Cough  Associated symptoms include chest pain, a fever, a sore throat, shortness of breath and wheezing. Her past medical history is significant for asthma and pneumonia. There is no history of COPD.   Sore Throat   Associated symptoms include coughing and shortness of breath.   Shortness of Breath  This is a new problem. The current episode started in the past 7 days. The problem occurs constantly. The problem has been gradually worsening. The average episode lasts 7 days. Associated symptoms include chest pain, coryza, a fever, orthopnea, a sore throat and wheezing. The symptoms are aggravated by any activity. The patient has no known risk factors for DVT/PE. She has tried OTC cough suppressants, beta agonist inhalers, body position changes, cool air and rest for the symptoms. The treatment provided no relief. Her past medical history is significant for asthma, bronchiolitis and pneumonia. There is no history of allergies, aspirin allergies, CAD, chronic lung disease, COPD, DVT, a heart failure, PE or a recent surgery.     Presents to the clinic with cough, sore throat SOB and congestion.  Other family members with same including fatigue and fever.  Has nebulizer at home - needs refill on albuterol.  Joaquina's allergies, medications, history, and problem list were updated as appropriate.    Review of Systems   Constitutional:  Positive for fever.   HENT:  Positive for sore throat.    Respiratory:  Positive for cough, shortness of breath and wheezing.    Cardiovascular:  Positive for chest pain and orthopnea.      A comprehensive review of symptoms was completed and negative except as noted above.    Recent Results (from the past 3 weeks)   Basic Metabolic Panel    Collection Time: 11/25/24  3:58 PM   Result Value Ref Range    Sodium 138 136 - 145  "mmol/L    Potassium 4.1 3.5 - 5.1 mmol/L    Chloride 102 98 - 110 mmol/L    CO2 27 21 - 32 mmol/L    Glucose 93 70 - 115 mg/dL    Blood Urea Nitrogen 11 7.0 - 20.0 mg/dL    Creatinine 0.79 0.66 - 1.25 mg/dL    BUN/Creatinine Ratio 14 12 - 20    Calcium 9.9 8.4 - 10.2 mg/dL    Anion Gap 9.0 2.0 - 13.0 mEq/L    eGFR >90 mL/min/1.73/m2       OBJECTIVE:  Vital signs  Vitals:    12/02/24 0803   BP: 126/82   BP Location: Right arm   Patient Position: Sitting   Pulse: 85   Temp: 97.4 °F (36.3 °C)   TempSrc: Oral   SpO2: 97%   Weight: 89.6 kg (197 lb 9.6 oz)   Height: 5' 0.98" (1.549 m)        Physical Exam  Constitutional:       Appearance: Normal appearance.   HENT:      Head: Normocephalic and atraumatic.      Right Ear: Tympanic membrane, ear canal and external ear normal.      Left Ear: Tympanic membrane, ear canal and external ear normal.      Nose: Nose normal.      Mouth/Throat:      Mouth: Mucous membranes are moist.      Pharynx: Oropharynx is clear. Posterior oropharyngeal erythema (mild with pnd) present.   Eyes:      Conjunctiva/sclera: Conjunctivae normal.   Cardiovascular:      Rate and Rhythm: Normal rate and regular rhythm.   Pulmonary:      Effort: Pulmonary effort is normal.      Breath sounds: Wheezing (mild scattered) present.   Abdominal:      General: Bowel sounds are normal.      Palpations: Abdomen is soft.   Musculoskeletal:         General: Normal range of motion.      Cervical back: Normal range of motion and neck supple.   Skin:     General: Skin is warm.      Capillary Refill: Capillary refill takes less than 2 seconds.   Neurological:      Mental Status: She is alert.          ASSESSMENT/PLAN:  1. Respiratory illness  Comments:  will cover for mycoplasma, rest, fluids, tylenol/ibuprofen prn fever/pain, otc meds of parent's choice for cough/congestion, complete all antibiotics  Orders:  -     dexAMETHasone injection 10 mg  -     azithromycin (Z-JERRY) 250 MG tablet; Take 2 tablets by mouth on day " 1; Take 1 tablet by mouth on days 2-5  Dispense: 6 tablet; Refill: 0  -     albuterol (PROVENTIL) 2.5 mg /3 mL (0.083 %) nebulizer solution; Take 3 mLs (2.5 mg total) by nebulization every 6 (six) hours as needed for Wheezing. Rescue  Dispense: 60 each; Refill: 0  -     albuterol nebulizer solution 2.5 mg  -     ipratropium 0.02 % nebulizer solution 0.5 mg    Wheezing improved post nebulizer treatment in office.    Follow Up:  Follow up if symptoms worsen or fail to improve.

## 2024-12-04 ENCOUNTER — PATIENT MESSAGE (OUTPATIENT)
Dept: FAMILY MEDICINE | Facility: CLINIC | Age: 34
End: 2024-12-04
Payer: COMMERCIAL

## 2024-12-04 ENCOUNTER — HOSPITAL ENCOUNTER (OUTPATIENT)
Dept: RADIOLOGY | Facility: HOSPITAL | Age: 34
Discharge: HOME | End: 2024-12-04
Attending: NURSE PRACTITIONER
Payer: COMMERCIAL

## 2024-12-04 DIAGNOSIS — J18.9 PNEUMONIA DUE TO INFECTIOUS ORGANISM, UNSPECIFIED LATERALITY, UNSPECIFIED PART OF LUNG: Primary | ICD-10-CM

## 2024-12-04 DIAGNOSIS — J18.9 PNEUMONIA DUE TO INFECTIOUS ORGANISM, UNSPECIFIED LATERALITY, UNSPECIFIED PART OF LUNG: ICD-10-CM

## 2024-12-04 PROCEDURE — 71046 X-RAY EXAM CHEST 2 VIEWS: CPT | Mod: TC

## 2024-12-05 ENCOUNTER — TELEPHONE (OUTPATIENT)
Dept: FAMILY MEDICINE | Facility: CLINIC | Age: 34
End: 2024-12-05
Payer: COMMERCIAL

## 2024-12-05 ENCOUNTER — PATIENT MESSAGE (OUTPATIENT)
Dept: FAMILY MEDICINE | Facility: CLINIC | Age: 34
End: 2024-12-05
Payer: COMMERCIAL

## 2024-12-05 NOTE — TELEPHONE ENCOUNTER
----- Message from Marguerite Martin NP sent at 12/5/2024 10:09 AM CST -----  Please notify patient of normal chest x-ray.

## 2024-12-10 DIAGNOSIS — F41.9 ANXIETY: ICD-10-CM

## 2024-12-10 RX ORDER — ALPRAZOLAM 0.25 MG/1
0.25 TABLET ORAL
Qty: 30 TABLET | Refills: 0 | Status: SHIPPED | OUTPATIENT
Start: 2024-12-10

## 2025-02-19 DIAGNOSIS — F41.9 ANXIETY: ICD-10-CM

## 2025-02-20 RX ORDER — ALPRAZOLAM 0.25 MG/1
0.25 TABLET ORAL
Qty: 30 TABLET | Refills: 0 | Status: SHIPPED | OUTPATIENT
Start: 2025-02-20

## 2025-04-03 ENCOUNTER — PATIENT MESSAGE (OUTPATIENT)
Dept: FAMILY MEDICINE | Facility: CLINIC | Age: 35
End: 2025-04-03
Payer: COMMERCIAL

## 2025-04-03 DIAGNOSIS — F41.9 ANXIETY: ICD-10-CM

## 2025-04-03 RX ORDER — ALPRAZOLAM 0.25 MG/1
0.25 TABLET ORAL 3 TIMES DAILY PRN
Qty: 30 TABLET | Refills: 0 | Status: SHIPPED | OUTPATIENT
Start: 2025-04-03

## 2025-05-05 DIAGNOSIS — F41.9 ANXIETY: ICD-10-CM

## 2025-05-05 RX ORDER — ALPRAZOLAM 0.25 MG/1
0.25 TABLET ORAL 3 TIMES DAILY PRN
Qty: 30 TABLET | Refills: 0 | Status: SHIPPED | OUTPATIENT
Start: 2025-05-05

## 2025-05-05 RX ORDER — ALPRAZOLAM 0.25 MG/1
TABLET ORAL
Qty: 30 TABLET | Refills: 0 | Status: SHIPPED | OUTPATIENT
Start: 2025-05-05 | End: 2025-05-05 | Stop reason: SDUPTHER

## 2025-06-09 DIAGNOSIS — F41.9 ANXIETY: ICD-10-CM

## 2025-06-10 RX ORDER — ALPRAZOLAM 0.25 MG/1
0.25 TABLET ORAL 3 TIMES DAILY PRN
Qty: 30 TABLET | Refills: 0 | Status: SHIPPED | OUTPATIENT
Start: 2025-06-10

## 2025-07-15 DIAGNOSIS — Z78.0 POST-MENOPAUSAL: ICD-10-CM

## 2025-07-15 RX ORDER — ESTRADIOL 1 MG/1
1 TABLET ORAL
Qty: 30 TABLET | Refills: 0 | Status: SHIPPED | OUTPATIENT
Start: 2025-07-15